# Patient Record
Sex: FEMALE | Race: WHITE | NOT HISPANIC OR LATINO | Employment: FULL TIME
[De-identification: names, ages, dates, MRNs, and addresses within clinical notes are randomized per-mention and may not be internally consistent; named-entity substitution may affect disease eponyms.]

---

## 2017-06-03 ENCOUNTER — HEALTH MAINTENANCE LETTER (OUTPATIENT)
Age: 32
End: 2017-06-03

## 2017-08-01 ENCOUNTER — OFFICE VISIT (OUTPATIENT)
Dept: FAMILY MEDICINE | Facility: CLINIC | Age: 32
End: 2017-08-01

## 2017-08-01 VITALS
WEIGHT: 234.4 LBS | RESPIRATION RATE: 20 BRPM | HEART RATE: 103 BPM | BODY MASS INDEX: 34.72 KG/M2 | OXYGEN SATURATION: 99 % | SYSTOLIC BLOOD PRESSURE: 106 MMHG | DIASTOLIC BLOOD PRESSURE: 82 MMHG | TEMPERATURE: 99.3 F | HEIGHT: 69 IN

## 2017-08-01 DIAGNOSIS — N39.0 URINARY TRACT INFECTION, SITE UNSPECIFIED: ICD-10-CM

## 2017-08-01 DIAGNOSIS — R30.0 DYSURIA: Primary | ICD-10-CM

## 2017-08-01 LAB
BACTERIA URINE: ABNORMAL
BILIRUB UR QL STRIP: 0
BLOOD URINE DIP: 0
CASTS/LPF: 0
COLOR UR: YELLOW
CRYSTAL URINE: 0
EPITHELIAL CELLS - QUEST: ABNORMAL
GLUCOSE UR STRIP-MCNC: 0 MG/DL
KETONES UR QL STRIP: 0
LEUKOCYTE ESTERASE URINE DIP: ABNORMAL
MUCOUS URINE: 0
NITRITE UR QL STRIP: ABNORMAL
PH UR STRIP: 5.5 PH (ref 5–9)
PROT UR QL: 0 MG/DL (ref ?–0.01)
RBC URINE: ABNORMAL (ref 0–3)
SP GR UR STRIP: 1 (ref 1–1.02)
UROBILINOGEN UR QL STRIP: 0.2 EU/DL (ref 0.2–1)
WBC URINE: ABNORMAL (ref 0–3)

## 2017-08-01 PROCEDURE — 99213 OFFICE O/P EST LOW 20 MIN: CPT | Performed by: FAMILY MEDICINE

## 2017-08-01 PROCEDURE — 81003 URINALYSIS AUTO W/O SCOPE: CPT | Performed by: FAMILY MEDICINE

## 2017-08-01 RX ORDER — AMOXICILLIN 500 MG/1
500 CAPSULE ORAL 3 TIMES DAILY
Qty: 21 CAPSULE | Refills: 0 | Status: SHIPPED | OUTPATIENT
Start: 2017-08-01 | End: 2019-07-10

## 2017-08-01 NOTE — MR AVS SNAPSHOT
After Visit Summary   8/1/2017    Tisha Almeida    MRN: [id number]           Patient Information     Date Of Birth          1985        Visit Information        Provider Department      8/1/2017 4:30 PM Garrick Shannon MD [facility]        Today's Diagnoses     Dysuria    -  1    Urinary tract infection, site unspecified          Care Instructions      Urinary Tract Infections in Women    Urinary tract infections (UTIs) are most often caused by bacteria (germs). These bacteria enter the urinary tract. The bacteria may come from outside the body. Or they may travel from the skin outside the rectum or vagina into the urethra. Female anatomy makes it easy for bacteria from the bowel to enter a woman s urinary tract, which is the most common source of UTI. This means women develop UTIs more often than men. Pain in or around the urinary tract is a common UTI symptom. But the only way to know for sure if you have a UTI for the healthcare provider to test your urine. The two tests that may be done are the urinalysis and urine culture.  Types of UTIs    Cystitis: A bladder infection (cystitis) is the most common UTI in women. You may have urgent or frequent urination. You may also have pain, burning when you urinate, and bloody urine.    Urethritis: This is an inflamed urethra, which is the tube that carries urine from the bladder to outside the body. You may have lower stomach or back pain. You may also have urgent or frequent urination.    Pyelonephritis: This is a kidney infection. If not treated, it can be serious and damage your kidneys. In severe cases, you may be hospitalized. You may have a fever and lower back pain.  Medicines to treat a UTI  Most UTIs are treated with antibiotics. These kill the bacteria. The length of time you need to take them depends on the type of infection. It may be as short as 3 days. If you have repeated UTIs, a low-dose antibiotic may be needed for  several months. Take antibiotics exactly as directed. Don t stop taking them until all of the medicine is gone. If you stop taking the antibiotic too soon, the infection may not go away, and you may develop a resistance to the antibiotic. This can make it much harder to treat.  Lifestyle changes to treat and prevent UTIs  The lifestyle changes below will help get rid of your UTI. They may also help prevent future UTIs.    Drink plenty of fluids. This includes water, juice, or other caffeine-free drinks. Fluids help flush bacteria out of your body.    Empty your bladder. Always empty your bladder when you feel the urge to urinate. And always urinate before going to sleep. Urine that stays in your bladder can lead to infection. Try to urinate before and after sex as well.    Practice good personal hygiene. Wipe yourself from front to back after using the toilet. This helps keep bacteria from getting into the urethra.    Use condoms during sex. These help prevent UTIs caused by sexually transmitted bacteria. Also, avoid using spermicides during sex. These can increase the risk of UTIs. Choose other forms of birth control instead. For women who tend to get UTIs after sex, a low-dose of a preventive antibiotic may be used. Be sure to discuss this option with your healthcare provider.    Follow up with your healthcare provider as directed. He or she may test to make sure the infection has cleared. If needed, more treatment may be started.  Date Last Reviewed: 1/1/2017 2000-2017 [employer]. [address] [place] [address], [place] [address]. All rights reserved. This information is not intended as a substitute for professional medical care. Always follow your healthcare professional's instructions.                Follow-ups after your visit        Who to contact     If you have questions or need follow up information about today's clinic visit or your schedule please contact [facility] directly at  "[phone].  Normal or non-critical lab and imaging results will be communicated to you by MyChart, letter or phone within 4 business days after the clinic has received the results. If you do not hear from us within 7 days, please contact the clinic through [employer] or phone. If you have a critical or abnormal lab result, we will notify you by phone as soon as possible.  Submit refill requests through [employer] or call your pharmacy and they will forward the refill request to us. Please allow 3 business days for your refill to be completed.          Additional Information About Your Visit        [email or web address] Information     [employer] lets you send messages to your doctor, view your test results, renew your prescriptions, schedule appointments and more. To sign up, go to [email or web address] . Click on \"Log in\" on the left side of the screen, which will take you to the Welcome page. Then click on \"Sign up Now\" on the right side of the page.     You will be asked to enter the access code listed below, as well as some personal information. Please follow the directions to create your username and password.     Your access code is: [email or web address]  Expires: 10/30/2017  5:33 PM     Your access code will  in 90 days. If you need help or a new code, please call your [place] clinic or [phone].        Care EveryWhere ID     This is your Care EveryWhere ID. This could be used by other organizations to access your [place] medical records  [id number]        Your Vitals Were     Pulse Temperature Respirations Height Last Period Pulse Oximetry    103 99.3  F (37.4  C) (Oral) 20 1.753 m (5' 9\") 2017 99%    BMI (Body Mass Index)                   34.61 kg/m2            Blood Pressure from Last 3 Encounters:   17 106/82   16 128/88   16 138/82    Weight from Last 3 Encounters:   17 106.3 kg (234 lb 6.4 oz)   16 106.1 kg (234 lb)   16 106.6 kg (235 lb)              We Performed the " Following     Urinalysis w/reflex protein, bili ([facility])     Urine Culture  Routine ([facility])          Today's Medication Changes          These changes are accurate as of: 8/1/17  5:33 PM.  If you have any questions, ask your nurse or doctor.               Start taking these medicines.        Dose/Directions    amoxicillin 500 MG capsule   Commonly known as:  AMOXIL   Used for:  Dysuria, Urinary tract infection, site unspecified   Started by:  Garrick Shannon MD        Dose:  500 mg   Take 1 capsule (500 mg) by mouth 3 times daily   Quantity:  21 capsule   Refills:  0            Where to get your medicines      These medications were sent to [facility] - [place], [place] - [address] AT [facility] [address] [facility] [address]  [address] [facility], [facility] [address]     Phone:  [phone]     amoxicillin 500 MG capsule                Primary Care Provider Office Phone # Fax #    Alvin Yoon [phone]       [facility] [address] [place] [address]        Equal Access to Services     [place] [facility]ALVARO : Hadii aad ku hadasho Soomaali, waaxda luqadaha, qaybta kaalmada adeegyada, jorge luis guadarrama . So [facility] [phone].    ATENCIÓN: Si habla español, tiene a brito disposición servicios gratyonnyos de asistencia lingüística. [facility] [phone].    We comply with applicable federal civil rights laws and [place] laws. We do not discriminate on the basis of race, color, national origin, age, disability sex, sexual orientation or gender identity.            Thank you!     Thank you for choosing [facility]  for your care. Our goal is always to provide you with excellent care. Hearing back from our patients is one way we can continue to improve our services. Please take a few minutes to complete the written survey that you may receive in the mail after your visit with us. Thank you!             Your Updated  Medication List - Protect others around you: Learn how to safely use, store and throw away your medicines at www.disposemymeds.org.          This list is accurate as of: 8/1/17  5:33 PM.  Always use your most recent med list.                   Brand Name Dispense Instructions for use Diagnosis    amoxicillin 500 MG capsule    AMOXIL    21 capsule    Take 1 capsule (500 mg) by mouth 3 times daily    Dysuria, Urinary tract infection, site unspecified       MELATONIN PO      Take 5 mg by mouth        MULTIVITAMIN & MINERAL PO      Take 1 tablet by mouth daily        NUVARING VA

## 2017-08-01 NOTE — PATIENT INSTRUCTIONS
Urinary Tract Infections in Women    Urinary tract infections (UTIs) are most often caused by bacteria (germs). These bacteria enter the urinary tract. The bacteria may come from outside the body. Or they may travel from the skin outside the rectum or vagina into the urethra. Female anatomy makes it easy for bacteria from the bowel to enter a woman s urinary tract, which is the most common source of UTI. This means women develop UTIs more often than men. Pain in or around the urinary tract is a common UTI symptom. But the only way to know for sure if you have a UTI for the healthcare provider to test your urine. The two tests that may be done are the urinalysis and urine culture.  Types of UTIs    Cystitis: A bladder infection (cystitis) is the most common UTI in women. You may have urgent or frequent urination. You may also have pain, burning when you urinate, and bloody urine.    Urethritis: This is an inflamed urethra, which is the tube that carries urine from the bladder to outside the body. You may have lower stomach or back pain. You may also have urgent or frequent urination.    Pyelonephritis: This is a kidney infection. If not treated, it can be serious and damage your kidneys. In severe cases, you may be hospitalized. You may have a fever and lower back pain.  Medicines to treat a UTI  Most UTIs are treated with antibiotics. These kill the bacteria. The length of time you need to take them depends on the type of infection. It may be as short as 3 days. If you have repeated UTIs, a low-dose antibiotic may be needed for several months. Take antibiotics exactly as directed. Don t stop taking them until all of the medicine is gone. If you stop taking the antibiotic too soon, the infection may not go away, and you may develop a resistance to the antibiotic. This can make it much harder to treat.  Lifestyle changes to treat and prevent UTIs  The lifestyle changes below will help get rid of your UTI. They may  also help prevent future UTIs.    Drink plenty of fluids. This includes water, juice, or other caffeine-free drinks. Fluids help flush bacteria out of your body.    Empty your bladder. Always empty your bladder when you feel the urge to urinate. And always urinate before going to sleep. Urine that stays in your bladder can lead to infection. Try to urinate before and after sex as well.    Practice good personal hygiene. Wipe yourself from front to back after using the toilet. This helps keep bacteria from getting into the urethra.    Use condoms during sex. These help prevent UTIs caused by sexually transmitted bacteria. Also, avoid using spermicides during sex. These can increase the risk of UTIs. Choose other forms of birth control instead. For women who tend to get UTIs after sex, a low-dose of a preventive antibiotic may be used. Be sure to discuss this option with your healthcare provider.    Follow up with your healthcare provider as directed. He or she may test to make sure the infection has cleared. If needed, more treatment may be started.  Date Last Reviewed: 1/1/2017 2000-2017 [employer]. [address] [place] [address]. All rights reserved. This information is not intended as a substitute for professional medical care. Always follow your healthcare professional's instructions.

## 2017-08-01 NOTE — LETTER
[facility] [address] [place]  [address]  Phone: [phone]    August 7, 2017      Tisha Marion Almeida  [address] [facility] [address]              Dear Tisha,    The Urine test showed a common bacteria - E coli. You should feel better. Repeat the urine test after treating.        Sincerely,     Garrick Seth M.D.    Results for orders placed or performed in visit on 08/01/17   Urinalysis w/reflex protein, bili ([facility])   Result Value Ref Range    Color Urine Yellow     pH Urine 5.5 5 - 9 pH    Specific Gravity Urine 1.005 1.005 - 1.025    Protein Urine 0 0.01 mg/dL    Glucose Urine 0     Ketones Urine 0     Leukocyte Esterase Urine 1+ (A)     Blood Urine 0     Nitrite Urine Neg NEG    Bilirubin Urine Dip 0     Urobilinogen Urine 0.2 0.2 - 1.0 EU/dL    WBC Urine 6-11 0 - 3    RBC Urine 0-3 0 - 3    Epithelial Cells Few     Crystal Urine 0     Bacteria Urine Mod     Mucous Urine 0     Casts/LPF 0    Urine Culture  Routine ([facility])   Result Value Ref Range    Urine Culture Final report (A)     Result 1 Escherichia coli (A)     Antimicrobial Susceptibility Comment     Narrative    Performed at:  01 - [facility] [address], [place], [place]  [id number]  : Pierre Ham MD, Phone:  [phone]

## 2017-08-01 NOTE — PROGRESS NOTES
SUBJECTIVE: Tisha Almeida is a 31 year old female who complains of urinary frequency, urgency and dysuria x 2 days, without flank pain, fever, chills, or abnormal vaginal discharge or bleeding.   No bubble bath   More sexually active recent  Past Medical History:   Diagnosis Date     Single kidney      Wilm's tumor (nephroblastoma) (H)     10 year old     Current Outpatient Prescriptions   Medication         Etonogestrel-Ethinyl Estradiol (NUVARING VA)     MELATONIN PO     Multiple Vitamins-Minerals (MULTIVITAMIN & MINERAL PO)     No current facility-administered medications for this visit.        OBJECTIVE: Appears well, in no apparent distress.  Vital signs are normal. The abdomen is soft without tenderness, guarding, mass, rebound or organomegaly. No CVA tenderness or inguinal adenopathy noted. Urine dipstick shows positive for WBC's.  Micro exam: 5-10 WBC's per HPF.     ASSESSMENT: UTI uncomplicated without evidence of pyelonephritis    PLAN: Treatment per orders - also push fluids, may use Pyridium OTC prn. Call or return to clinic prn if these symptoms worsen or fail to improve as anticipated.  UA AFTER ROSEES    Herb Seth MD

## 2017-08-05 LAB
ANTIMICROBIAL SUSCEPTIBILITY: ABNORMAL
Lab: ABNORMAL
URINE CULTURE: ABNORMAL

## 2019-06-17 PROBLEM — N39.0 URINARY TRACT INFECTION: Status: RESOLVED | Noted: 2017-08-01 | Resolved: 2017-08-01

## 2019-07-10 ENCOUNTER — OFFICE VISIT (OUTPATIENT)
Dept: FAMILY MEDICINE | Facility: CLINIC | Age: 34
End: 2019-07-10

## 2019-07-10 VITALS
HEART RATE: 78 BPM | WEIGHT: 237 LBS | SYSTOLIC BLOOD PRESSURE: 122 MMHG | DIASTOLIC BLOOD PRESSURE: 80 MMHG | RESPIRATION RATE: 16 BRPM | OXYGEN SATURATION: 98 % | BODY MASS INDEX: 35 KG/M2

## 2019-07-10 DIAGNOSIS — Z90.5 SINGLE KIDNEY: Primary | ICD-10-CM

## 2019-07-10 DIAGNOSIS — M79.605 LOW BACK PAIN RADIATING TO LEFT LOWER EXTREMITY: ICD-10-CM

## 2019-07-10 DIAGNOSIS — M54.50 LOW BACK PAIN RADIATING TO LEFT LOWER EXTREMITY: ICD-10-CM

## 2019-07-10 DIAGNOSIS — N39.0 URINARY TRACT INFECTION WITHOUT HEMATURIA, SITE UNSPECIFIED: Primary | ICD-10-CM

## 2019-07-10 PROCEDURE — 99213 OFFICE O/P EST LOW 20 MIN: CPT | Performed by: NURSE PRACTITIONER

## 2019-07-10 PROCEDURE — 81003 URINALYSIS AUTO W/O SCOPE: CPT | Performed by: NURSE PRACTITIONER

## 2019-07-10 RX ORDER — ETONOGESTREL/ETHINYL ESTRADIOL .12-.015MG
RING, VAGINAL VAGINAL
Refills: 3 | COMMUNITY
Start: 2019-05-03

## 2019-07-10 RX ORDER — CYCLOBENZAPRINE HCL 5 MG
5 TABLET ORAL 3 TIMES DAILY PRN
Qty: 30 TABLET | Refills: 0 | Status: SHIPPED | OUTPATIENT
Start: 2019-07-10 | End: 2019-07-20

## 2019-07-10 RX ORDER — NITROFURANTOIN MACROCRYSTAL 100 MG
100 CAPSULE ORAL 2 TIMES DAILY
Qty: 10 CAPSULE | Refills: 0 | Status: SHIPPED | OUTPATIENT
Start: 2019-07-10 | End: 2019-07-15

## 2019-07-10 NOTE — PATIENT INSTRUCTIONS
I have prescribed cyclobenzaprine, this medication is a muscle relaxant. You have been prescribed this because tylenol and Ibuprofen have not been affective in controlling the pain.  I have ordered an MRI to look for a potential herniated disk based on your physical exam. We ordered a test to look at your kidney function. I will call with the results of youPatient Education     Cyclobenzaprine Extended Release Capsule  Brand Name: Amrix  What is this medicine?  CYCLOBENZAPRINE (sye kloe CHERELLE za preen) is a muscle relaxer. It is used to treat muscle pain, spasms, and stiffness.  How should I use this medicine?  Take this medicine by mouth with a glass of water. Follow the directions on the prescription label. Do not cut, crush or chew this medicine. If this medicine upsets your stomach, take it with food or milk. Take your medicine at regular intervals. Do not take it more often than directed.  Talk to your pediatrician regarding the use of this medicine in children. Special care may be needed.  What side effects may I notice from receiving this medicine?  Side effects that you should report to your doctor or health care professional as soon as possible:    allergic reactions like skin rash, itching or hives, swelling of the face, lips, or tongue    breathing problems    chest pain    fast, irregular heartbeat    hallucinations    seizures    unusually weak or tired  Side effects that usually do not require medical attention (report to your doctor or health care professional if they continue or are bothersome):    headache    nausea, vomiting  What may interact with this medicine?  Do not take this medicine with any of the following medications:    certain medicines for fungal infections like fluconazole, itraconazole, ketoconazole, posaconazole, voriconazole    cisapride    dofetilide    dronedarone    halofantrine    levomethadyl    MAOIs like Carbex, Eldepryl, Marplan, Nardil, and Parnate    narcotic medicines for  cough    pimozide    thioridazine    ziprasidone  This medicine may also interact with the following medications:    alcohol    antihistamines for allergy, cough and cold    certain medicines for anxiety or sleep    certain medicines for cancer    certain medicines for depression like amitriptyline, fluoxetine, sertraline    certain medicines for infection like alfuzosin, chloroquine, clarithromycin, levofloxacin, mefloquine, pentamidine, troleandomycin    certain medicines for irregular heart beat    certain medicines for seizures like phenobarbital, primidone    contrast dyes    general anesthetics like halothane, isoflurane, methoxyflurane, propofol    local anesthetics like lidocaine, pramoxine, tetracaine    medicines that relax muscles for surgery    narcotic medicines for pain    other medicines that prolong the QT interval (cause an abnormal heart rhythm)    phenothiazines like chlorpromazine, mesoridazine, prochlorperazine  What if I miss a dose?  If you miss a dose, take it as soon as you can. If it is almost time for your next dose, take only that dose. Do not take double or extra doses.  Where should I keep my medicine?  Keep out of the reach of children.  Store at room temperature between 15 and 30 degrees C (59 and 86 degrees F). Keep container tightly closed. Throw away any unused medicine after the expiration date.  What should I tell my health care provider before I take this medicine?  They need to know if you have any of these conditions:    heart disease    irregular heartbeat    liver disease    past heart attack    thyroid problem    an unusual or allergic reaction to cyclobenzaprine, tricyclic antidepressants, lactose, other medicines, foods, dyes, or preservatives    pregnant or trying to get pregnant    breast-feeding  What should I watch for while using this medicine?  Tell your doctor or healthcare professional if your symptoms do not start to get better or if they get worse.  You may get  drowsy or dizzy. Do not drive, use machinery, or do anything that needs mental alertness until you know how this medicine affects you. Do not stand or sit up quickly, especially if you are an older patient. This reduces the risk of dizzy or fainting spells. Alcohol may interfere with the effect of this medicine. Avoid alcoholic drinks.  If you are taking another medicine that also causes drowsiness, you may have more side effects. Give your health care provider a list of all medicines you use. Your doctor will tell you how much medicine to take. Do not take more medicine than directed. Call emergency for help if you have problems breathing or unusual sleepiness.  Your mouth may get dry. Chewing sugarless gum or sucking hard candy, and drinking plenty of water may help. Contact your doctor if the problem does not go away or is severe.  NOTE:This sheet is a summary. It may not cover all possible information. If you have questions about this medicine, talk to your doctor, pharmacist, or health care provider. Copyright  2018 [employer]         r tests.

## 2019-07-10 NOTE — PROGRESS NOTES
"Specific cause: {.:476759::\"None\"}  Description:   Location of pain: {.:561979}  Character of pain: {.:197845}  Pain radiation:{.:188543::\"none\"}  Intensity: {.:312301}  Accompanying Signs & Symptoms:  Fever: {.:209948::\"no\"}  Numbness or weakness in legs:  { :203681::\"no\"}  Dysuria or Hematuria: { :008297::\"no\"}  Bowel or bladder incontinence: { :751301::\"no\"}  History:   Any injury (lifting, bending, twisting): {.:561034::\"no\"}  Work Injury: {.:020370::\"no\"}  History of back problems: {.:069077::\"no prior back problems\"}  Any previous MRI or X-rays: {.:506757::\"no\"}  Any history of back surgery: { :064015::\"no\"}  Any cancer history: { :559262::\"no\"}  Precipitating factors:   Worsened by: {.:639626::\"Nothing\"}.  Alleviating factors:  Improved by: ***  Therapies Tried and outcome: {.:535457}  "

## 2019-07-10 NOTE — PROGRESS NOTES
UA positive for urinary tract infection. Called Pt. To give results and information about antibiotic therapy.

## 2019-07-10 NOTE — PROGRESS NOTES
Subjective     Tisha Almeida is a 33 year old female who presents to clinic today for the following health issues:    HPI   Back Pain       Duration: More than six months of left lower back pain.        Specific cause: lifting, turning/bending    Description:   Location of pain: low back left and now radiating to left leg  Character of pain: sharp, dull ache, constant and intermittent  Pain radiation:radiates into the left buttocks and radiates into the left leg  New numbness or weakness in legs, not attributed to pain:  no     Intensity: Currently 8/10, At its worst 8/10    History:   Pain interferes with job: YES  History of back problems: no prior back problems  Any previous MRI or X-rays: Yes- MRI: Head (for Hx of HA) at [facility].  Date 2017  Sees a specialist for back pain:  No  Therapies tried without relief: acetaminophen (Tylenol), activity, chiropractor, cold, heat, massage, NSAIDs, rest, sitting, standing and stretch    Alleviating factors:   Improved by: Nothing is helping      Precipitating factors:  Worsened by: Lifting, Bending, Standing, Sitting, Lying Flat and Walking        Accompanying Signs & Symptoms:  Risk of Fracture:  Recent history of trauma or blunt force  Risk of Cauda Equina:  None  Risk of Infection:  None  Risk of Cancer:  History of cancer  Risk of Ankylosing Spondylitis:  Onset at age <35, male, AND morning back stiffness. no       Patient Active Problem List   Diagnosis     Bariatric surgery status     Single kidney     Past Surgical History:   Procedure Laterality Date     CHOLECYSTECTOMY, LAPOROSCOPIC  2005    Cholecystectomy, Laparoscopic     NEPHRECTOMY  1996    Wilms tumor     TONSILLECTOMY & ADENOIDECTOMY  2004       Social History     Tobacco Use     Smoking status: Never Smoker     Smokeless tobacco: Never Used   Substance Use Topics     Alcohol use: Yes     Alcohol/week: 0.0 oz     No family history on file.      Current Outpatient Medications   Medication Sig  Dispense Refill     MELATONIN PO Take 5 mg by mouth       NUVARING 0.12-0.015 MG/24HR vaginal ring   3     No Known Allergies    Reviewed and updated as needed this visit by Provider  Med Hx         Review of Systems   ROS COMP: CONSTITUTIONAL:NEGATIVE for fever, chills, change in weight  RESP:NEGATIVE for significant cough or SOB  : negative for, decreased urinary stream, dysparunia, dysuria, frequency , hematuria, hesitancy, incontinence, kidney stone, retention, sexually transmitted disease, urgency and urinary tract infection  MUSCULOSKELETAL: NEGATIVE for significant arthralgias or myalgia and POSITIVE  for back pain lower left back and radicular pain left buttocks and leg above the knee  NEURO: NEGATIVE for weakness, dizziness or paresthesias      Objective    /80   Pulse 78   Resp 16   Wt 107.5 kg (237 lb)   LMP 07/03/2019   SpO2 98%   BMI 35.00 kg/m    Body mass index is 35 kg/m . LMP: 06/28/2019  Physical Exam   GENERAL: healthy, alert and no distress  NECK: no adenopathy, no asymmetry, masses, or scars and thyroid normal to palpation  ABDOMEN: soft, nontender, no hepatosplenomegaly, no masses and bowel sounds normal  MS: no gross musculoskeletal defects noted, no edema  SKIN: no suspicious lesions or rashes  NEURO: Normal strength and tone, mentation intact and speech normal  PSYCH: mentation appears normal, affect normal/bright    Labs reviewed in Epic        Assessment  1. Low back pain radiating to left lower extremity    2. Single kidney        Plan  Orders Placed This Encounter     MR Lumbar Spine w/o Contrast     Urinalysis w/reflex protein, bili ([facility])     Comp. Metabolic Panel (14) ([facility])     NUVARING 0.12-0.015 MG/24HR vaginal ring     cyclobenzaprine (FLEXERIL) 5 MG tablet       I have prescribed cyclobenzaprine, this medication is a muscle relaxant. You have been prescribed this because tylenol and Ibuprofen have not been affective in controlling the pain.  I have ordered an MRI  to look for a potential herniated disk based on your physical exam. We ordered a test to look at your kidney function. I will call with the results of your tests.

## 2019-07-11 LAB
ALBUMIN SERPL-MCNC: 4.4 G/DL (ref 3.5–5.5)
ALBUMIN/GLOB SERPL: 1.7 {RATIO} (ref 1.2–2.2)
ALP SERPL-CCNC: 71 IU/L (ref 39–117)
ALT SERPL-CCNC: 17 IU/L (ref 0–32)
AST SERPL-CCNC: 15 IU/L (ref 0–40)
BILIRUB SERPL-MCNC: 0.3 MG/DL (ref 0–1.2)
BUN SERPL-MCNC: 8 MG/DL (ref 6–20)
BUN/CREATININE RATIO: 12 (ref 9–23)
CALCIUM SERPL-MCNC: 9.5 MG/DL (ref 8.7–10.2)
CHLORIDE SERPLBLD-SCNC: 105 MMOL/L (ref 96–106)
CREAT SERPL-MCNC: 0.69 MG/DL (ref 0.57–1)
EGFR IF AFRICN AM: 130 ML/MIN/1.73
EGFR IF NONAFRICN AM: 113 ML/MIN/1.73
GLOBULIN, TOTAL: 2.6 G/DL (ref 1.5–4.5)
GLUCOSE SERPL-MCNC: 73 MG/DL (ref 65–99)
POTASSIUM SERPL-SCNC: 4 MMOL/L (ref 3.5–5.2)
PROT SERPL-MCNC: 7 G/DL (ref 6–8.5)
SODIUM SERPL-SCNC: 139 MMOL/L (ref 134–144)
TOTAL CO2: 24 MMOL/L (ref 20–29)

## 2019-07-12 ENCOUNTER — TRANSFERRED RECORDS (OUTPATIENT)
Dept: FAMILY MEDICINE | Facility: CLINIC | Age: 34
End: 2019-07-12

## 2019-07-12 ENCOUNTER — TRANSFERRED RECORDS (OUTPATIENT)
Dept: HEALTH INFORMATION MANAGEMENT | Facility: CLINIC | Age: 34
End: 2019-07-12

## 2019-07-12 LAB
Lab: NO GROWTH
URINE CULTURE: NORMAL

## 2019-08-01 ENCOUNTER — TRANSFERRED RECORDS (OUTPATIENT)
Dept: FAMILY MEDICINE | Facility: CLINIC | Age: 34
End: 2019-08-01

## 2019-12-09 ENCOUNTER — HEALTH MAINTENANCE LETTER (OUTPATIENT)
Age: 34
End: 2019-12-09

## 2020-01-14 ENCOUNTER — TELEPHONE (OUTPATIENT)
Dept: FAMILY MEDICINE | Facility: CLINIC | Age: 35
End: 2020-01-14

## 2020-01-14 DIAGNOSIS — J11.1 INFLUENZA-LIKE ILLNESS: Primary | ICD-10-CM

## 2020-01-14 RX ORDER — OSELTAMIVIR PHOSPHATE 75 MG/1
75 CAPSULE ORAL 2 TIMES DAILY
Qty: 10 CAPSULE | Refills: 0 | Status: SHIPPED | OUTPATIENT
Start: 2020-01-14 | End: 2020-01-19

## 2020-01-14 NOTE — TELEPHONE ENCOUNTER
Patient calls reporting onset early this AM of fever of 101, body aches, headache, cough and sore throat.  Denies wheeze, dyspnea, chest congestion.   No known influenza exposure.   Did not get flu shot this season.   No chronic diseases.   Lives alone.   Plan: per Dr. Car beasley for Tamiflu 75mg BID x 5 days. Discussed home treatments for viral syndrome. Discussed signs and symptoms of concern and call/get eval if these present. Patient verbalized understanding. Rx sent to pharmacy.  Polly Ott RN

## 2020-03-15 ENCOUNTER — HEALTH MAINTENANCE LETTER (OUTPATIENT)
Age: 35
End: 2020-03-15

## 2020-04-25 ENCOUNTER — VIRTUAL VISIT (OUTPATIENT)
Dept: FAMILY MEDICINE | Facility: OTHER | Age: 35
End: 2020-04-25

## 2020-04-25 ENCOUNTER — NURSE TRIAGE (OUTPATIENT)
Dept: NURSING | Facility: CLINIC | Age: 35
End: 2020-04-25

## 2020-04-25 NOTE — PROGRESS NOTES
"Date: 2020 14:31:25  Clinician: Rebecca Kelley  Clinician NPI: [id number]  Patient: Tisha Almeida  Patient : 1985  Patient Address: [address] [place] [address]  Patient Phone: [phone]  Visit Protocol: URI  Patient Summary:  Tisha is a 34 year old ( : 1985 ) female who initiated a Visit for COVID-19 (Coronavirus) evaluation and screening. When asked the question \"Please sign me up to receive news, health information and promotions from [employer].\", Tisha responded \"No\".    Her symptoms consist of facial pain or pressure, rhinitis, a headache, and nausea.   Symptom details     Nasal secretions: The color of her mucus is clear.    Facial pain or pressure: The facial pain or pressure does not feel worse when bending or leaning forward.     Headache: She states the headache is mild (1-3 on a 10 point pain scale).      Tisha denies having sore throat, cough, nasal congestion, malaise, ear pain, fever, myalgias, wheezing, chills, vomiting, teeth pain, ageusia, anosmia, and diarrhea. She also denies taking antibiotic medication for the symptoms and having recent facial or sinus surgery in the past 60 days. She is not experiencing dyspnea.    Pertinent COVID-19 (Coronavirus) information  Tisha has not traveled internationally or to the areas where COVID-19 (Coronavirus) is widespread, including cruise ship travel in the last 14 days before the start of her symptoms.   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   Tisha has had a close contact with a laboratory-confirmed COVID-19 patient within 14 days of symptom onset. She also has had a close contact with a suspected COVID-19 patient within 14 days of symptom onset. She " was exposed at her work. Additional information about contact with COVID-19 (Coronavirus) patient as reported by the patient (free text): 4/20/20, worked within close proximity of a co-worker with a now confirmed covid case. Work is requiring I get tested.   Pertinent medical history  Tisha does not get yeast infections when she takes antibiotics.   Tisha needs a return to work/school note.   Weight: 235 lbs   Tisha does not smoke or use smokeless tobacco.   She denies pregnancy and denies breastfeeding. She has menstruated in the past month.   Weight: 235 lbs    MEDICATIONS: NuvaRing vaginal, ALLERGIES: NKDA  Clinician Response:  Dear Tisha,   Dear Tisha  Your symptoms show that you may have coronavirus (COVID-19). This illness can cause fever, cough and trouble breathing. Many people get a mild case and get better on their own. Some people can get very sick.  Will I be tested for COVID-19?   Currently we do not have expanded testing available through [facility]. However things may change in the next week. I would encourage you to sign up for the get well loop (see below) so we could keep communicating and you can keep checking in to when our testing is able to expand.&nbsp;  At this time you may request for testing if:&nbsp;   You are very ill. For example, you're on chemotherapy, dialysis or home hospice care. (Contact your specialty clinic or program.)   You live in a nursing home or other long-term care facility. (Talk to your nurse manager or medical director.)   You're a health care worker. (Contact your employee health office.)   How can I protect others?  Without a test, we can't know for sure that you have COVID-19. For safety, it's very important to follow these rules.  First, stay home and away from others (self-isolate) until:   You've had no fever---and no medicine that reduces fever---for 3 full days (72 hours). And...    Your other symptoms have gotten better. For example, your cough or  breathing has improved. And...   At least 7 days have passed since your symptoms started.   During this time:   Don't go to work, school or anywhere else.    Stay away from others in your home. No hugging, kissing or shaking hands.   Don't let anyone visit.   Cover your mouth and nose with a mask, tissue or wash cloth to avoid spreading germs.   Wash your hands and face often. Use soap and water.   How can I take care of myself?   1.Take Tylenol (acetaminophen) for fever or pain. If you have liver or kidney problems, ask your family doctor if it's okay to take Tylenol.        Adults can take either:    650 mg (two 325 mg pills) every 4 to 6 hours, or...   1,000 mg (two 500 mg pills) every 8 hours as needed.    Note: Don't take more than 3,000 mg in one day.   For children, check the Tylenol bottle for the right dose. The dose is based on the child's age or weight.   2.If you have other health problems (like cancer, heart failure, an organ transplant or severe kidney disease): Call your specialty clinic if you don't feel better in the next 2 days.       3.Know when to call 911: If your breathing is so bad that it keeps you from doing normal activities, call 911 or go to the emergency room. Tell them that you've been staying home and may have COVID-19.       4.Sign up for [facility]. We know it's scary to hear that you might have COVID-19. We want to track your symptoms to make sure you're okay over the next 2 weeks. Please look for an email from [facility]---this is a free, online program that we'll use to keep in touch. To sign up, follow the link in the email. Learn more at [email or web address].   Where can I get more information?  To learn more about COVID-19 and how to care for yourself at home, please visit the CDC website at https://www.cdc.gov/coronavirus/2019-ncov/about/steps-when-sick.html.  For more options for care at [facility], please visit our website at  [email or web address]     Diagnosis: Other malaise  Diagnosis ICD: R53.81

## 2020-04-25 NOTE — TELEPHONE ENCOUNTER
Patient called because she was exposed to someone that was Covid-19 positive. She completed [facility] and is aware that she needs to self quarantine. She is not experiencing any symptoms. She wanted to know where to go to get testing for Covid-19. She was informed that since she didn't have any symptoms, testing was not currently done on patients without symptoms. She stated her employer told her to get tested. I referred her to her employee health department. She said they referred her to this phone number. I explained again that people without symptoms are currently not being tested. She said she will follow-up on it herself.    Courtney Kapoor RN on 4/25/2020 at 3:49 PM      COVID 19 Nurse Triage Plan/Patient Instructions    Please be aware that novel coronavirus (COVID-19) may be circulating in the community. If you develop symptoms such as fever, cough, or SOB or if you have concerns about the presence of another infection including coronavirus (COVID-19), please contact your health care provider or visit [email or web address].     Disposition/Instructions    Patient to stay at home and follow home care protocol based instructions.     Thank you for limiting contact with others, wearing a simple mask to cover your cough, practice good hand hygiene habits and accessing our virtual services where possible to limit the spread of this virus.    For more information about COVID19 and options for caring for yourself at home, please visit the CDC website at https://www.cdc.gov/coronavirus/2019-ncov/about/steps-when-sick.html  For more options for care at [facility], please visit our website at [email or web address]    For more information, please use the [place] Department of Health COVID-19 Website: https://www.health.state.[place]/diseases/coronavirus/index.html  [facility] ([facility]) COVID-19 Hotlines (Interpreters available):      Health questions: Phone Number:  "[phone] or 1-495.745.1602 and Hours: 7 a.m. to 7 p.m.    Schools and  questions: Phone Number: [phone] or [phone] and Hours 7 a.m. to 7 p.m.    Reason for Disposition    [1] COVID-19 EXPOSURE (Close Contact) within last 14 days AND [2] NO cough, fever, or breathing difficulty    Additional Information    Negative: SEVERE difficulty breathing (e.g., struggling for each breath, speak in single words, bluish lips)    Negative: Sounds like a life-threatening emergency to the triager    Negative: [1] Difficulty breathing occurs AND [2] within 14 days of COVID-19 EXPOSURE (Close Contact)    Negative: Patient sounds very sick or weak to the triager    Negative: [1] Fever (or feeling feverish) OR cough AND [2] within 14 Days of COVID-19 EXPOSURE (Close Contact)    Negative: [1] Fever (or feeling feverish) OR cough occurs AND [2] within 14 days of travel from another country (international travel)    Negative: [1] Fever (or feeling feverish) OR cough occurs AND [2] within 14 days of travel from a city or area with major community spread    Negative: [1] Fever (or feeling feverish) OR cough occurs AND [2] living in area with major community spread AND [3] testing being done for symptoms    Negative: [1] Mild body aches, chills, diarrhea, headache, runny nose, or sore throat AND [2] within 14 days of COVID-Exposure    Negative: [1] COVID-19 EXPOSURE within last 14 days AND [2] NO cough, fever, or breathing difficulty AND [3] exposed 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 CLOSE CONTACT: \"Who is the person with the confirmed or suspected COVID-19 infection that you were exposed to?\"      A coworker  2. PLACE of CONTACT: \"Where were you when you were exposed to COVID-19?\" (e.g., home, school, medical waiting room; which city?)      " "While at work  3. TYPE of CONTACT: \"How much contact was there?\" (e.g., sitting next to, live in same house, work in same office, same building)     Unknown  4. DURATION of CONTACT: \"How long were you in contact with the COVID-19 patient?\" (e.g., a few seconds, passed by person, a few minutes, live with the patient)      Unknown  5. DATE of CONTACT: \"When did you have contact with a COVID-19 patient?\" (e.g., how many days ago)      It was not a patient, it was a coworker  6. TRAVEL: \"Have you traveled out of the country recently?\" If so, \"When and where?\"      * Also ask about out-of-state travel, since the [facility] has identified some high risk cities for community spread in the .      * Note: Travel becomes less relevant if there is widespread community transmission where the patient lives.      No  7. COMMUNITY SPREAD: \"Are there lots of cases or COVID-19 (community spread) where you live?\" (See public health department website, if unsure)    * MAJOR community spread: high number of cases; numbers of cases are increasing; many people hospitalized.    * MINOR community spread: low number of cases; not increasing; few or no people hospitalized      Group home  8. SYMPTOMS: \"Do you have any symptoms?\" (e.g., fever, cough, breathing difficulty)      None  9. PREGNANCY OR POSTPARTUM: \"Is there any chance you are pregnant?\" \"When was your last menstrual period?\" \"Did you deliver in the last 2 weeks?\"      No  10. HIGH RISK: \"Do you have any heart or lung problems? Do you have a weak immune system?\" (e.g., CHF, COPD, asthma, HIV positive, chemotherapy, renal failure, diabetes mellitus, sickle cell anemia)        No    Protocols used: CORONAVIRUS (COVID-19) EXPOSURE-A- 3.30.20      "

## 2020-04-30 ENCOUNTER — VIRTUAL VISIT (OUTPATIENT)
Dept: FAMILY MEDICINE | Facility: CLINIC | Age: 35
End: 2020-04-30

## 2020-04-30 DIAGNOSIS — Z20.822 EXPOSURE TO COVID-19 VIRUS: Primary | ICD-10-CM

## 2020-04-30 PROCEDURE — 99213 OFFICE O/P EST LOW 20 MIN: CPT | Mod: GT | Performed by: FAMILY MEDICINE

## 2020-04-30 NOTE — PROGRESS NOTES
"  Problem(s) Oriented visit      Tisha Almeida is being evaluated via a billable video visit.      The patient has been notified of following:     \"This video visit will be conducted via a call between you and your physician/provider. We have found that certain health care needs can be provided without the need for an in-person physical exam.  This service lets us provide the care you need with a video conversation.  If a prescription is necessary we can send it directly to your pharmacy.  If lab work is needed we can place an order for that and you can then stop by our lab to have the test done at a later time.    If during the course of the call the physician/provider feels a video visit is not appropriate, you will not be charged for this service.\"     Physician has received verbal consent for a Video Visit from the patient? Yes  4:34 PM  SUBJECTIVE:                                                    Subjective     Tisha Almeida is a 34 year old female who presents to clinic today for the following health issues:    HPI   Lots of exposure at work  St.  Random cough  Lots of symptom has zero symtpoms.  She works in a [employer] setting.        Histories:   Patient Active Problem List   Diagnosis     Bariatric surgery status     Single kidney     Past Surgical History:   Procedure Laterality Date     CHOLECYSTECTOMY, LAPOROSCOPIC  2005    Cholecystectomy, Laparoscopic     NEPHRECTOMY  1996    Wilms tumor     TONSILLECTOMY & ADENOIDECTOMY  2004       Social History     Tobacco Use     Smoking status: Never Smoker     Smokeless tobacco: Never Used   Substance Use Topics     Alcohol use: Yes     Alcohol/week: 0.0 standard drinks     No family history on file.        Reviewed and updated as needed this visit by Provider         ROS:  General and Resp. completed and negative except as noted above          OBJECTIVE:                                                      Objective    There were no vitals " "taken for this visit.  Estimated body mass index is 35 kg/m  as calculated from the following:    Height as of 8/1/17: 1.753 m (5' 9\").    Weight as of 7/10/19: 107.5 kg (237 lb).    Physical Exam   APPEARANCE: = Relaxed and in no distress  Recent/Remote Memory = Alert and Oriented x 3  Mood/Affect = Cooperative and interested            ASSESSMENT/PLAN:                                                        There are no Patient Instructions on file for this visit.  There are no diagnoses linked to this encounter.    See Patient Instructions    Video-Visit Details    Type of service:  Video Visit  Originating Location (pt. Location): Home  Distant Location (provider location):  [facility]   Mode of Communication:  Video Conference via Apple Facetime      The following health maintenance items are reviewed in Epic and correct as of today:  Health Maintenance   Topic Date Due     HIV SCREENING  11/04/1998     PAP  11/04/2004     PREVENTIVE CARE VISIT  08/01/2017     INFLUENZA VACCINE (1) 09/01/2019     PHQ-2  01/01/2020     DTAP/TDAP/TD IMMUNIZATION (8 - Td) 08/01/2026     IPV IMMUNIZATION  Completed     MENINGITIS IMMUNIZATION  Aged Out       Alvin Yoon MD  [facility]  Family Practice  [facility]  [phone]    For any issues my office # is [phone]          "

## 2020-05-01 ENCOUNTER — VIRTUAL VISIT (OUTPATIENT)
Dept: FAMILY MEDICINE | Facility: OTHER | Age: 35
End: 2020-05-01

## 2020-05-01 ENCOUNTER — OFFICE VISIT (OUTPATIENT)
Dept: URGENT CARE | Facility: URGENT CARE | Age: 35
End: 2020-05-01
Payer: COMMERCIAL

## 2020-05-01 DIAGNOSIS — Z20.822 SUSPECTED 2019 NOVEL CORONAVIRUS INFECTION: Primary | ICD-10-CM

## 2020-05-01 PROCEDURE — 99000 SPECIMEN HANDLING OFFICE-LAB: CPT | Performed by: FAMILY MEDICINE

## 2020-05-01 PROCEDURE — 99207 ZZC NO BILLABLE SERVICE THIS VISIT: CPT

## 2020-05-01 PROCEDURE — 87635 SARS-COV-2 COVID-19 AMP PRB: CPT | Mod: 90 | Performed by: FAMILY MEDICINE

## 2020-05-01 NOTE — PROGRESS NOTES
"Date: 2020 07:34:08  Clinician: José Luis Staples  Clinician NPI: [id number]  Patient: Tisha Almeida  Patient : 1985  Patient Address: [address] [place] [address]  Patient Phone: [phone]  Visit Protocol: URI  Patient Summary:  Tisha is a 34 year old ( : 1985 ) female who initiated a Visit for COVID-19 (Coronavirus) evaluation and screening. When asked the question \"Please sign me up to receive news, health information and promotions from [employer].\", Tisha responded \"No\".    Tisha states her symptoms started suddenly 3-4 days ago.   Her symptoms consist of malaise, rhinitis, a sore throat, a cough, diarrhea, and a headache.   Symptom details     Nasal secretions: The color of her mucus is clear.    Cough: Tisha coughs a few times an hour and her cough is not more bothersome at night. Phlegm does not come into her throat when she coughs. She does not believe her cough is caused by post-nasal drip.     Sore throat: Tisha reports having moderate throat pain (4-6 on a 10 point pain scale), does not have exudate on her tonsils, and can swallow liquids. She is not sure if the lymph nodes in her neck are enlarged. A rash has not appeared on the skin since the sore throat started.     Headache: She states the headache is mild (1-3 on a 10 point pain scale).      Tisha denies having fever, myalgias, facial pain or pressure, nasal congestion, vomiting, nausea, teeth pain, ageusia, anosmia, ear pain, wheezing, and chills. She also denies taking antibiotic medication for the symptoms, double sickening (worsening symptoms after initial improvement), and having recent facial or sinus surgery in the past 60 days. She is not experiencing dyspnea.   Precipitating events  Within the past week, Tisha has not been exposed to someone with strep throat. She has not recently been exposed to someone with influenza. Tisha has been in close contact with the following high risk individuals: " adults 65 or older, people with asthma, heart disease or diabetes, and immunocompromised people.   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 She provides direct patient care. 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   She lives with a healthcare worker.   Tisha has had a close contact with a laboratory-confirmed COVID-19 patient within 14 days of symptom onset. She also has had a close contact with a suspected COVID-19 patient within 14 days of symptom onset. She was exposed at her work. Additional information about contact with COVID-19 (Coronavirus) patient as reported by the patient (free text): Been work with clients during the entire pandemic, I provided direct care with clients who have been tested positive. They were confirmed positive this week.     I complete toileting, bathing, eating, transfer assistance and more with clients.   Pertinent medical history  Tisha does not get yeast infections when she takes antibiotics.   Tisha does not need a return to work/school note.   Weight: 235 lbs   Tisha does not smoke or use smokeless tobacco.   She denies pregnancy and denies breastfeeding. She has menstruated in the past month.   Weight: 235 lbs    MEDICATIONS: NuvaRing vaginal, ALLERGIES: NKDA  Clinician Response:  Dear Tisha,   Your symptoms show that you may have coronavirus (COVID-19). This illness can cause fever, cough and trouble breathing. Many people get a mild case and get better on their own. Some people can get very sick.   Will I be tested for COVID-19?  We would recommend you be tested for coronavirus. Here is how to get that scheduled:   Call [phone]. Tell them you were referred by [facility] to have a COVID-19 test. You will be scheduled at one of our [facility] testing  locations (drive-up). Please have your [facility] visit information ready when you call including your visit ID number to verify you were referred.    How can I protect others in the meantime?  First, stay home and away from others (self-isolate) until:   You've had no fever---and no medicine that reduces fever---for 3 full days (72 hours). And...    Your other symptoms have gotten better. For example, your cough or breathing has improved. And...    At least 7 days have passed since your symptoms started.   During this time:   Don't go to work, school or anywhere else.     Stay away from others in your home. No hugging, kissing or shaking hands.    Don't let anyone visit.    Cover your mouth and nose with a mask, tissue or wash cloth to avoid spreading germs.    Wash your hands and face often. Use soap and water.   How can I take care of myself?  1.Take Tylenol (acetaminophen) for fever or pain. If you have liver or kidney problems, ask your family doctor if it's okay to take Tylenol.   Adults can take either:    650 mg (two 325 mg pills) every 4 to 6 hours, or...    1,000 mg (two 500 mg pills) every 8 hours as needed.     Note: Don't take more than 3,000 mg in one day.   For children, check the Tylenol bottle for the right dose. The dose is based on the child's age or weight.  2.If you have other health problems (like cancer, heart failure, an organ transplant or severe kidney disease): Call your specialty clinic if you don't feel better in the next 2 days.  3.Know when to call 911: If your breathing is so bad that it keeps you from doing normal activities, call 911 or go to the emergency room. Tell them that you've been staying home and may have COVID-19.  4.Sign up for [employer]. We know it's scary to hear that you might have COVID-19. We want to track your symptoms to make sure you're okay over the next 2 weeks. Please look for an email from [employer]---this is a free, online program that we'll use to keep in  touch. To sign up, follow the link in the email. Learn more at [email or web address].  Where can I get more information?  To learn more about COVID-19 and how to care for yourself at home, please visit the CDC website at https://www.cdc.gov/coronavirus/2019-ncov/about/steps-when-sick.html.  For more about your care at [facility], please visit [email or web address]     Diagnosis: Cough  Diagnosis ICD: R05

## 2020-05-02 LAB
SARS-COV-2 RNA SPEC QL NAA+PROBE: NOT DETECTED
SPECIMEN SOURCE: NORMAL

## 2020-06-16 PROCEDURE — 36415 COLL VENOUS BLD VENIPUNCTURE: CPT | Performed by: FAMILY MEDICINE

## 2020-06-16 PROCEDURE — 86769 SARS-COV-2 COVID-19 ANTIBODY: CPT | Performed by: FAMILY MEDICINE

## 2020-06-16 NOTE — LETTER
June 22, 2020        Tisha Almeida  [address] [address]      COVID-19 Antibody, IgG   Date Value Ref Range Status   06/16/2020 Negative NEG^Negative Final     Comment:     Negative results do not rule out SARS-CoV-2 infection, particularly in those   who have been in contact with the virus.  Follow-up testing with a molecular   diagnostic should be considered to rule out infection in these individuals.  Results from antibody testing should not be used as the sole basis to diagnose   or exclude SARS-CoV-2 infection or to inform infection status.           You have tested NEGATIVE for COVID-19 antibodies. This suggests you have not had or been exposed to COVID-19. But it does not mean that for sure.     The test finds antibodies in most people 10 days after they get sick. For some people, it takes longer than 10 days for antibodies to show up. Others may never show antibodies against COVID-19, especially if they have weak immune systems.    If you have COVID-19 symptoms now, please stay home and away from others.     What is antibody testing?    This is a kind of blood test. We take a small sample of your blood, and then test it for something called  antibodies.      Your body makes antibodies to fight infection. If your blood has antibodies for a certain germ, it means you ve been infected with that germ in the past.     Sometimes, antibodies stay in your body for years after you ve had the infection. They can be there even if the germ didn t make you sick. They are a sign that your body fought off the infection.    Will this test find antibodies in everyone who s had COVID-19?    No. The test finds antibodies in most people 10 days after they get sick. For some people, it takes longer than 10 days for antibodies to show up. Others may never show antibodies against COVID-19, especially if they have weak immune systems.    What does it mean if the test finds COVID-19 antibodies?    If we find  these antibodies, it suggests:     This person has had the virus.     Their body s immune system fought the virus.     We don t know if this will help protect someone from getting COVID-19 again. Scientists are still learning about this.    What are the signs of COVID-19?    Signs of COVID-19 can appear from 2 to 14 days (up to 2 weeks) after you re infected. Some people have no symptoms or only mild symptoms. Others get very sick. The most common symptoms are:          Cough    Shortness of breath or trouble breathing  Or at least 2 of these symptoms:    Fever    Chills    Repeated shaking with chills    Muscle pain    Headache    Sore throat    Losing your sense of taste or smell    You may have other symptoms. Please contact your doctor or clinic for any symptoms that worry you.    Where can I get more information?     To learn the [facility] guidelines for staying home, please visit the [place] Department of Health website at https://www.health.[facility].[place]/diseases/coronavirus/basics.html    To learn more about COVID-19 and how to care for yourself at home, please visit the CDC website at https://www.cdc.gov/coronavirus/2019-ncov/about/steps-when-sick.html    For more options for care at [facility], please visit our website at [email or web address]    [facility] ([facility]) COVID-19 Hotline:  [phone]

## 2020-06-21 LAB
COVID-19 ANTIBODY IGG: NEGATIVE
LAB TEST METHOD: NORMAL

## 2020-09-21 ENCOUNTER — VIRTUAL VISIT (OUTPATIENT)
Dept: FAMILY MEDICINE | Facility: CLINIC | Age: 35
End: 2020-09-21

## 2020-09-21 DIAGNOSIS — Z20.822 SUSPECTED COVID-19 VIRUS INFECTION: ICD-10-CM

## 2020-09-21 DIAGNOSIS — Z20.822 SUSPECTED COVID-19 VIRUS INFECTION: Primary | ICD-10-CM

## 2020-09-21 LAB
SARS-COV-2 RNA SPEC QL NAA+PROBE: NOT DETECTED
SPECIMEN SOURCE: NORMAL

## 2020-09-21 PROCEDURE — 99213 OFFICE O/P EST LOW 20 MIN: CPT | Mod: 95 | Performed by: NURSE PRACTITIONER

## 2020-09-21 PROCEDURE — U0003 INFECTIOUS AGENT DETECTION BY NUCLEIC ACID (DNA OR RNA); SEVERE ACUTE RESPIRATORY SYNDROME CORONAVIRUS 2 (SARS-COV-2) (CORONAVIRUS DISEASE [COVID-19]), AMPLIFIED PROBE TECHNIQUE, MAKING USE OF HIGH THROUGHPUT TECHNOLOGIES AS DESCRIBED BY CMS-2020-01-R: HCPCS | Performed by: NURSE PRACTITIONER

## 2020-09-21 NOTE — PROGRESS NOTES
Problem(s) Oriented visit    Video-Visit Details    Type of service:  Video Visit    Video Start Time (time video started): 1002    Video End Time (time video stopped): 1007    Originating Location (pt. Location): Home    Distant Location (provider location):  [facility]     Mode of Communication:  Video Conference via Facetime    Physician has received verbal consent for a Video Visit from the patient? Yes    This was a virtual video-visit conducted during COVID-19 outbreak in regulation with social distancing and quarantine recommendations of the CDC and [place] department of health and human services. A two way audio/video connection was used in real time with patient's consent.    ALYSE Carrasquillo CNP    SUBJECTIVE:                                                    Tisha Almeida is a 34 year old female who presents to clinic today for the following health issues :    Symptoms started 2 days ago and include sore throat, runny nose, headache, feels tired & run down, cough. Denies fever, chills, loss of taste/smell, shortness of breath, diarrhea.  Has taken a cough drop with some relief.  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 Tested back in June and was negative.  Does not need note for work at this time.    Problem list, Medication list, Allergies, and Medical/Social/Surgical histories reviewed in [facility] and updated as appropriate.   Additional history: as documented    ROS:  7 point ROS completed and negative except noted above, including Gen, HEENT, CV, Resp, GI, Neuro    Histories:   Patient Active Problem List   Diagnosis     Bariatric surgery status     Single kidney     Past Surgical History:   Procedure Laterality Date     CHOLECYSTECTOMY, LAPOROSCOPIC  2005    Cholecystectomy, Laparoscopic     NEPHRECTOMY  1996    Wilms tumor     TONSILLECTOMY & ADENOIDECTOMY  2004       Social History     Tobacco Use     Smoking status: Never Smoker     Smokeless tobacco: Never Used    Substance Use Topics     Alcohol use: Yes     Alcohol/week: 0.0 standard drinks     History reviewed. No pertinent family history.        OBJECTIVE:                                                    No vitals taken due to telehealth visit    APPEARANCE: = Relaxed and in no distress  Conj/Eyelids = noninjected and lids and lashes are without inflammation  Ears/Nose = External structures and Nares have usual shape and form  Resp effort = Calm regular breathing, dry cough x 1  Recent/Remote Memory = Alert and Oriented x 3  Mood/Affect = Cooperative and interested     ASSESSMENT/PLAN:                                                        Tisha was seen today for pharyngitis.    Diagnoses and all orders for this visit:    Suspected COVID-19 virus infection  -     Symptomatic COVID-19 Virus (Coronavirus) by PCR; Future        See Patient Instructions  Patient Instructions   Call [phone] to schedule Covid testing    Discharge Instructions for COVID-19 Patients  You have--or may have--COVID-19. Please follow the instructions listed below.   If you have a weakened immune system, discuss with your doctor any other actions you need to take.  How can I protect others?  If you have symptoms (fever, cough, body aches or trouble breathing):    Stay home and away from others (self-isolate) until:  ? At least 10 days have passed since your symptoms started. And   ? You've had no fever--and no medicine that reduces fever--for 1 full day (24 hours). And   ? Your other symptoms have resolved (gotten better).  If you don't show symptoms, but testing showed that you have COVID-19:    Stay home and away from others (self-isolate) until at least 10 days have passed since the date of your first positive COVID-19 test.  During this time    Stay in your own room, even for meals. Use your own bathroom if you can.    Stay away from others in your home. No hugging, kissing or shaking hands. No visitors.    Don't go to work, school or  "anywhere else.    Clean \"high touch\" surfaces often (doorknobs, counters, handles). Use household cleaning spray or wipes. You'll find a full list of  on the EPA website: www.epa.gov/pesticide-registration/list-n-disinfectants-use-against-sars-cov-2.    Cover your mouth and nose with a mask or other face covering to avoid spreading germs.    Wash your hands and face often. Use soap and water.    Caregivers in these groups are at risk for severe illness due to COVID-19:  ? People 65 years and older  ? People who live in a nursing home or long-term care facility  ? People with chronic disease (lung, heart, cancer, diabetes, kidney, liver, immunologic)  ? People who have a weakened immune system, including those who:    Are in cancer treatment    Take medicine that weakens the immune system, such as corticosteroids    Had a bone marrow or organ transplant    Have an immune deficiency    Have poorly controlled HIV or AIDS    Are obese (body mass index of 40 or higher)    Smoke regularly    Caregivers should wear gloves while washing dishes, handling laundry and cleaning bedrooms and bathrooms.    Use caution when washing and drying laundry: Don't shake dirty laundry and use the warmest water setting that you can.    For more tips on managing your health at home, go to www.cdc.gov/coronavirus/2019-ncov/downloads/10Things.pdf.  How can I take care of myself at home?  1. Get lots of rest. Drink extra fluids (unless a doctor has told you not to).  2. Take Tylenol (acetaminophen) for fever or pain. If you have liver or kidney problems, ask your family doctor if it's okay to take Tylenol.     Adults can take either:  ? 650 mg (two 325 mg pills) every 4 to 6 hours, or   ? 1,000 mg (two 500 mg pills) every 8 hours as needed.  ? Note: Don't take more than 3,000 mg in one day. Acetaminophen is found in many medicines (both prescribed and over-the-counter medicines). Read all labels to be sure you don't take too much.   " For children, check the Tylenol bottle for the right dose. The dose is based on the child's age or weight.  3. If you have other health problems (like cancer, heart failure, an organ transplant or severe kidney disease): Call your specialty clinic if you don't feel better in the next 2 days.  4. Know when to call 911. Emergency warning signs include:  ? Trouble breathing or shortness of breath  ? Pain or pressure in the chest that doesn't go away  ? Feeling confused like you haven't felt before, or not being able to wake up  ? Bluish-colored lips or face  5. Your doctor may have prescribed a blood thinner medicine. Follow their instructions.  Where can I get more information?     [employer] [place] - About COVID-19: [email or web address]    CDC - What to Do If You're Sick: www.cdc.gov/coronavirus/2019-ncov/about/steps-when-sick.html    CDC - Ending Home Isolation: www.cdc.gov/coronavirus/2019-ncov/hcp/disposition-in-home-patients.html    CDC - Caring for Someone: www.cdc.gov/coronavirus/2019-ncov/if-you-are-sick/care-for-someone.html    [facility] - Interim Guidance for Hospital Discharge to Home: www.[facility].[place]/diseases/coronavirus/hcp/hospdischarge.pdf    [facility] clinical trials (COVID-19 research studies): clinicalaffairs.[facility]-clinical-trials    Below are the COVID-19 hotlines at the [facility] of Health ([facility]). Interpreters are available.  ? For health questions: Call 206-183-0773 or 1-534.652.2799 (7 a.m. to 7 p.m.)  ? For questions about schools and childcare: Call 484-769-6811 or 1-193.655.8821 (7 a.m. to 7 p.m.)    For informational purposes only. Not to replace the advice of your health care provider. Clinically reviewed by the Infection Prevention Team. Copyright   2020 [employer]. All rights reserved. [employer] 998733 - REV 08/04/20.          The following health maintenance items are reviewed in Epic and correct as of today:  Health Maintenance   Topic  Date Due     HIV SCREENING  11/04/1998     PAP  11/04/2004     PREVENTIVE CARE VISIT  08/01/2017     PHQ-2  01/01/2020     INFLUENZA VACCINE (1) 09/01/2020     DTAP/TDAP/TD IMMUNIZATION (8 - Td) 08/01/2026     IPV IMMUNIZATION  Completed     MENINGITIS IMMUNIZATION  Aged Out     HEPATITIS B IMMUNIZATION  Aged Out       ALYSE Carrasquillo CNP  [facility]  Family Practice  [facility]  [phone]    For any issues my office # is [phone]

## 2020-09-21 NOTE — PATIENT INSTRUCTIONS
"Call [phone] to schedule Covid testing    Discharge Instructions for COVID-19 Patients  You have--or may have--COVID-19. Please follow the instructions listed below.   If you have a weakened immune system, discuss with your doctor any other actions you need to take.  How can I protect others?  If you have symptoms (fever, cough, body aches or trouble breathing):    Stay home and away from others (self-isolate) until:  ? At least 10 days have passed since your symptoms started. And   ? You've had no fever--and no medicine that reduces fever--for 1 full day (24 hours). And   ? Your other symptoms have resolved (gotten better).  If you don't show symptoms, but testing showed that you have COVID-19:    Stay home and away from others (self-isolate) until at least 10 days have passed since the date of your first positive COVID-19 test.  During this time    Stay in your own room, even for meals. Use your own bathroom if you can.    Stay away from others in your home. No hugging, kissing or shaking hands. No visitors.    Don't go to work, school or anywhere else.    Clean \"high touch\" surfaces often (doorknobs, counters, handles). Use household cleaning spray or wipes. You'll find a full list of  on the EPA website: www.epa.gov/pesticide-registration/list-n-disinfectants-use-against-sars-cov-2.    Cover your mouth and nose with a mask or other face covering to avoid spreading germs.    Wash your hands and face often. Use soap and water.    Caregivers in these groups are at risk for severe illness due to COVID-19:  ? People 65 years and older  ? People who live in a nursing home or long-term care facility  ? People with chronic disease (lung, heart, cancer, diabetes, kidney, liver, immunologic)  ? People who have a weakened immune system, including those who:    Are in cancer treatment    Take medicine that weakens the immune system, such as corticosteroids    Had a bone marrow or organ transplant    Have an " immune deficiency    Have poorly controlled HIV or AIDS    Are obese (body mass index of 40 or higher)    Smoke regularly    Caregivers should wear gloves while washing dishes, handling laundry and cleaning bedrooms and bathrooms.    Use caution when washing and drying laundry: Don't shake dirty laundry and use the warmest water setting that you can.    For more tips on managing your health at home, go to www.cdc.gov/coronavirus/2019-ncov/downloads/10Things.pdf.  How can I take care of myself at home?  1. Get lots of rest. Drink extra fluids (unless a doctor has told you not to).  2. Take Tylenol (acetaminophen) for fever or pain. If you have liver or kidney problems, ask your family doctor if it's okay to take Tylenol.     Adults can take either:  ? 650 mg (two 325 mg pills) every 4 to 6 hours, or   ? 1,000 mg (two 500 mg pills) every 8 hours as needed.  ? Note: Don't take more than 3,000 mg in one day. Acetaminophen is found in many medicines (both prescribed and over-the-counter medicines). Read all labels to be sure you don't take too much.   For children, check the Tylenol bottle for the right dose. The dose is based on the child's age or weight.  3. If you have other health problems (like cancer, heart failure, an organ transplant or severe kidney disease): Call your specialty clinic if you don't feel better in the next 2 days.  4. Know when to call 911. Emergency warning signs include:  ? Trouble breathing or shortness of breath  ? Pain or pressure in the chest that doesn't go away  ? Feeling confused like you haven't felt before, or not being able to wake up  ? Bluish-colored lips or face  5. Your doctor may have prescribed a blood thinner medicine. Follow their instructions.  Where can I get more information?     [employer] [place] - About COVID-19: [email or web address]    CDC - What to Do If You're Sick: www.cdc.gov/coronavirus/2019-ncov/about/steps-when-sick.html    CDC - Ending Home Isolation:  www.cdc.gov/coronavirus/2019-ncov/hcp/disposition-in-home-patients.html    CDC - Caring for Someone: www.cdc.gov/coronavirus/2019-ncov/if-you-are-sick/care-for-someone.html    [facility] - Interim Guidance for Hospital Discharge to Home: www.[facility].[place]/diseases/coronavirus/hcp/hospdischarge.pdf    [facility] clinical trials (COVID-19 research studies): clinicalaffairs.[facility]-clinical-trials    Below are the COVID-19 hotlines at the [place] Department of Health ([facility]). Interpreters are available.  ? For health questions: Call 545-550-0577 or 1-989.624.8795 (7 a.m. to 7 p.m.)  ? For questions about schools and childcare: Call 022-493-5978 or 1-334.927.7846 (7 a.m. to 7 p.m.)    For informational purposes only. Not to replace the advice of your health care provider. Clinically reviewed by the Infection Prevention Team. Copyright   2020 [facility]. All rights reserved. [employer] [id number] - REV 08/04/20.

## 2020-12-08 ENCOUNTER — TRANSFERRED RECORDS (OUTPATIENT)
Dept: FAMILY MEDICINE | Facility: CLINIC | Age: 35
End: 2020-12-08

## 2020-12-09 LAB
CHOLESTEROL (EXTERNAL): 172 MG/DL (ref 100–199)
CREATININE (EXTERNAL): 0.87 MG/DL (ref 0.57–1)
GFR ESTIMATED (EXTERNAL): 0 ML/MIN/1.73M2
GFR ESTIMATED (IF AFRICAN AMERICAN) (EXTERNAL): 0 ML/MIN/1.73M2
HBA1C MFR BLD: 5.6 % (ref 4.8–5.6)
HDLC SERPL-MCNC: 40 MG/DL (ref 39–99)
LDL CHOLESTEROL (EXTERNAL): 0 MG/DL (ref 0–0)
LDL CHOLESTEROL CALCULATED (EXTERNAL): 90 MG/DL (ref 0–99)
NON HDL CHOLESTEROL (EXTERNAL): 0 MG/DL (ref 0–0)
PAP-ABSTRACT: NORMAL
TRIGLYCERIDES (EXTERNAL): 250 MG/DL (ref 0–149)
TSH SERPL-ACNC: 1.77 UIU/ML (ref 0.45–4.5)
VITAMIN D 25: 27.4 (ref 30–100)

## 2020-12-23 LAB — PAP-ABSTRACT: NORMAL

## 2021-01-14 ENCOUNTER — HEALTH MAINTENANCE LETTER (OUTPATIENT)
Age: 36
End: 2021-01-14

## 2021-09-10 ENCOUNTER — TELEPHONE (OUTPATIENT)
Dept: FAMILY MEDICINE | Facility: CLINIC | Age: 36
End: 2021-09-10

## 2021-09-10 NOTE — TELEPHONE ENCOUNTER
----- Message from Melida Puckett CMA sent at 9/10/2021  2:59 PM CDT -----  Regarding: need records  Patient see's Sees OBGYN- Dr Tashia Cevallos  Please get records of last pap sent to us    Thanks  Nga

## 2021-10-23 ENCOUNTER — HEALTH MAINTENANCE LETTER (OUTPATIENT)
Age: 36
End: 2021-10-23

## 2022-02-12 ENCOUNTER — HEALTH MAINTENANCE LETTER (OUTPATIENT)
Age: 37
End: 2022-02-12

## 2022-03-01 ENCOUNTER — TRANSFERRED RECORDS (OUTPATIENT)
Dept: FAMILY MEDICINE | Facility: CLINIC | Age: 37
End: 2022-03-01

## 2022-08-11 ENCOUNTER — OFFICE VISIT (OUTPATIENT)
Dept: FAMILY MEDICINE | Facility: CLINIC | Age: 37
End: 2022-08-11

## 2022-08-11 VITALS
OXYGEN SATURATION: 100 % | WEIGHT: 248.4 LBS | HEART RATE: 96 BPM | BODY MASS INDEX: 36.68 KG/M2 | RESPIRATION RATE: 16 BRPM | SYSTOLIC BLOOD PRESSURE: 122 MMHG | DIASTOLIC BLOOD PRESSURE: 68 MMHG

## 2022-08-11 DIAGNOSIS — S63.501A WRIST SPRAIN, RIGHT, INITIAL ENCOUNTER: ICD-10-CM

## 2022-08-11 DIAGNOSIS — G43.809 OTHER MIGRAINE WITHOUT STATUS MIGRAINOSUS, NOT INTRACTABLE: ICD-10-CM
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
## 2022-08-11 DIAGNOSIS — M25.531 RIGHT WRIST PAIN: ICD-10-CM

## 2022-08-11 PROCEDURE — 73110 X-RAY EXAM OF WRIST: CPT | Mod: RT | Performed by: FAMILY MEDICINE

## 2022-08-11 PROCEDURE — 99214 OFFICE O/P EST MOD 30 MIN: CPT | Performed by: FAMILY MEDICINE

## 2022-08-11 NOTE — PROGRESS NOTES
SUBJECTIVE:    Tisha Almeida, is a 36 year old female with remote Hx of Wilm's tumor (with surgically absent kidney) presenting for the below:     1. Fall backwards, down 3 wooden steps onto concrete 5 days ago, striking right wrist. Immediate pain and mild swelling. Since then wrist has been moderately painful, although she has been able to continue working with a keyboard. Right hand dominant.     2. Migraines. Two district types. Once every 3-4 months: has onset of feeling like something is floating in the left eye, followed by monocular blurred vision and left sided dull headache. Triggered by light. Vision change last for 30 minutes then resolves. 1-2 times per month onset of neck stiffness and jaw tightness followed by left temporal headache with associated photophobia. Can last for up to 2 days. Has lost days from work.     Reports one episode, 2 years ago, of headache with associated left sided facial weakness, slurred speech and left arm weakness. She did not seek medical attention at that time.     Received chemotherapy at age 10 for Wilm's tumor. Nephew with migraines.     OBJECTIVE:  Vitals:    08/11/22 1332   BP: 122/68   Pulse: 96   Resp: 16   SpO2: 100%   Weight: 112.7 kg (248 lb 6.4 oz)    Body mass index is 36.68 kg/m .    General: no acute distress, cooperative with exam.  Eyes: no injection or drainage.  Right upper extremity: partially resolving ecchymosis overlying distal radius. All digits of right hand neurovascularly intact. Good  strength. No tenderness on telescoping of thumb. Some point tenderness over distal radius. Intact pronation and supination with no pain elicited.  Neuro: grossly intact   Psych: mental status normal, mood and affect appropriate.    Xray right wrist : no acute fractures seen.     MRI brain 5/2016 : no abnormalities detected.     ASSESSMENT / PLAN:      Fall down stairs, subsequent encounter  Wrist sprain, right, initial encounter  Right wrist pain  No  radiological evidence of acute bony injury. In keeping with ligamentous sprain. Conservative management discussed inclusive of rest, ice, compression. She will consider wearing a wrist splint during keyboard work for the next few days.   -     XR Wrist Right G/E 3 Views; Future    Other migraine without status migrainosus, not intractable   MRI brain 5/2016 : no abnormalities detected, but with episode of headache associated with slurred speech and left sided lateralizing weakness in 2019, advised evaluation with headache clinic. Patient in agreement.   -     Adult Neurology  Referral; Future

## 2022-09-28 NOTE — TELEPHONE ENCOUNTER
FUTURE VISIT INFORMATION      FUTURE VISIT INFORMATION:    Date: 11/21/2022    Time: 2pm    Location: [facility]  REFERRAL INFORMATION:    Referring provider:  Dr. Larry    Referring providers clinic:  [facility]     Reason for visit/diagnosis  Migraines     RECORDS REQUESTED FROM:       Clinic name Comments Records Status Imaging Status   Internal Dr. Larry-8/11/2022 Epic No Images          [facility]  MR Brain-5/23/2016 Scanned to chart Requested                        11/17/2022-Request for images faxed to [facility]-MR @ 501am     11/21/2022-[facility] images now in PACS, reports scanned to chart-MR @ 516am

## 2022-10-10 ENCOUNTER — HEALTH MAINTENANCE LETTER (OUTPATIENT)
Age: 37
End: 2022-10-10

## 2022-11-19 ASSESSMENT — HEADACHE IMPACT TEST (HIT 6)
HOW OFTEN HAVE YOU FELT TOO TIRED TO WORK BECAUSE OF YOUR HEADACHES: VERY OFTEN
HOW OFTEN HAVE YOU FELT FED UP OR IRRITATED BECAUSE OF YOUR HEADACHES: VERY OFTEN
WHEN YOU HAVE HEADACHES HOW OFTEN IS THE PAIN SEVERE: VERY OFTEN
HIT6 TOTAL SCORE: 72
HOW OFTEN DO HEADACHES LIMIT YOUR DAILY ACTIVITIES: ALWAYS
HOW OFTEN DID HEADACHS LIMIT CONCENTRATION ON WORK OR DAILY ACTIVITY: ALWAYS
WHEN YOU HAVE A HEADACHE HOW OFTEN DO YOU WISH YOU COULD LIE DOWN: ALWAYS

## 2022-11-21 ENCOUNTER — PRE VISIT (OUTPATIENT)
Dept: NEUROLOGY | Facility: CLINIC | Age: 37
End: 2022-11-21

## 2022-11-21 ENCOUNTER — OFFICE VISIT (OUTPATIENT)
Dept: NEUROLOGY | Facility: CLINIC | Age: 37
End: 2022-11-21
Attending: FAMILY MEDICINE
Payer: COMMERCIAL

## 2022-11-21 VITALS
DIASTOLIC BLOOD PRESSURE: 88 MMHG | SYSTOLIC BLOOD PRESSURE: 138 MMHG | OXYGEN SATURATION: 98 % | HEART RATE: 88 BPM | RESPIRATION RATE: 16 BRPM

## 2022-11-21 DIAGNOSIS — G43.109 MIGRAINE WITH AURA AND WITHOUT STATUS MIGRAINOSUS, NOT INTRACTABLE: Primary | ICD-10-CM

## 2022-11-21 DIAGNOSIS — G43.809 OTHER MIGRAINE WITHOUT STATUS MIGRAINOSUS, NOT INTRACTABLE: ICD-10-CM

## 2022-11-21 PROCEDURE — 99203 OFFICE O/P NEW LOW 30 MIN: CPT | Performed by: NURSE PRACTITIONER

## 2022-11-21 RX ORDER — PROCHLORPERAZINE MALEATE 5 MG
5-10 TABLET ORAL EVERY 6 HOURS PRN
Qty: 20 TABLET | Refills: 3 | Status: SHIPPED | OUTPATIENT
Start: 2022-11-21 | End: 2022-11-21

## 2022-11-21 RX ORDER — PROCHLORPERAZINE MALEATE 5 MG
5-10 TABLET ORAL EVERY 6 HOURS PRN
Qty: 20 TABLET | Refills: 3 | Status: SHIPPED | OUTPATIENT
Start: 2022-11-21

## 2022-11-21 ASSESSMENT — PAIN SCALES - GENERAL: PAINLEVEL: NO PAIN (0)

## 2022-11-21 NOTE — PROGRESS NOTES
"ASSESSMENT AND PLAN:  Headache and reported headache symptoms appear to be migraine with aura   Once episode with left arm weakness and slurred speech and lasted for an hour and nephew gets similar -question hemiplegic migraine. Unfortunately did not get medical evaluation.   Plan:  Would avoid estrogen or estrogen containing products  Would avoid triptans for now as well  Call 911 with any stroke like symptoms.   May try vit B2 200 mg daily and magnesium 200-400 OTC   Rescue treatment -Ubrelvy as needed and limit use to no more than 9 days per month   May try naproxen 2 tabs every 12 hours +prochlorperazine (side effects -anxious or drowsiness) +benedryl for rescue treatment and Ok to take all the same day with Ubrelvy   Follow up as needed but at least for meds renew or sooner if needed     Myofascial tightness/neck tightness -sees a chiropractor and aware of no neck manipulations. Will try an oral appliance to see if helps.       Gary Giles is a 37 year old  accompanied by her mother, presenting for the following health issues:  Consult  Hx of Wilm's tumor (with surgically absent kidney)     HPI     Per PCP visit note 8/11/2022 \"Two district types. Once every 3-4 months: has onset of feeling like something is floating in the left eye, followed by monocular blurred vision and left sided dull headache. Triggered by light. Vision change last for 30 minutes then resolves. 1-2 times per month onset of neck stiffness and jaw tightness followed by left temporal headache with associated photophobia. Can last for up to 2 days. Has lost days from work\".     Confirmed with the patient PCP descriptions. Always on the left side -severe headache and can be on the right but not as bed. Sometimes nausea when pain is severe. Light and noise sensitivity.   Pain on the left a sharp and does not go away for 12 hours and feels like a \"migraine hang over\"   Tx-heat, ice, tylenol, Excedrin, naproxen,  ibuprofen did not " help  No triptans tried     Once 4 years ago -migraine like with left arm weakness and slurred vision and was fine within an hour and did not go to see anyone. Similar symptoms -nephew has     Usually once per month -a severe headache and mild-moderate -a couple times per week a milder headache.     Always has been the patter since 11 years old.     FH-headaches uncle on father's side, great uncle and nephew. Mother has some typical normal headache    No family history of clotting disorders     Daily neck pain and TMJ tightness -used device and will ask her dentist.     SH:  Works as a social workser thru [employer]         Objective    Resp 16   There is no height or weight on file to calculate BMI.  Physical Exam   GENERAL: healthy, alert and no distress  EYES: Eyes grossly normal to inspection, PERRL and conjunctivae and sclerae normal  NECK: no adenopathy, no asymmetry, masses, or scars and thyroid normal to palpation  RESP: lungs clear to auscultation - no rales, rhonchi or wheezes  CV: regular rate and rhythm, normal S1 S2, no S3 or S4, no murmur, click or rub, no peripheral edema and peripheral pulses strong  MS: no gross musculoskeletal defects noted, no edema  NEURO: Normal strength and tone, sensory exam grossly normal, mentation intact, cranial nerves 2-12 intact, DTR's normal and symmetric, gait normal including heel/toe/tandem walking and Romberg normal  PSYCH: mentation appears normal, affect normal    DATA: reviewed    Diagnostic Test Results:  A1C 5.3    I discussed all my recommendations with Tisha Almeida who verbalizes understanding and comfortable with the plan.  All of patient's questions were answered from the best of my knowledge.  Patient is in agreement with the plan.     37 minutes spent on the date of the encounter doing face to face, chart  review, exam, results review,  meds review, treatment plan, documentation and further activities as noted above    ALYSE Newman,  CNP [facility]  Headache certified  [facility]

## 2022-11-21 NOTE — LETTER
"11/21/2022       RE: Tisha Almeida  [address] [address] [address]     Dear Colleague,    Thank you for referring your patient, Tisha Almeida, to the [facility]UROLOG[facility] [place] [facility]. Please see a copy of my visit note below.    ASSESSMENT AND PLAN:  Headache and reported headache symptoms appear to be migraine with aura   Once episode with left arm weakness and slurred speech and lasted for an hour and nephew gets similar -question hemiplegic migraine. Unfortunately did not get medical evaluation.   Plan:  Would avoid estrogen or estrogen containing products  Would avoid triptans for now as well  Call 911 with any stroke like symptoms.   May try vit B2 200 mg daily and magnesium 200-400 OTC   Rescue treatment -Ubrelvy as needed and limit use to no more than 9 days per month   May try naproxen 2 tabs every 12 hours +prochlorperazine (side effects -anxious or drowsiness) +benedryl for rescue treatment and Ok to take all the same day with Ubrelvy   Follow up as needed but at least for meds renew or sooner if needed     Myofascial tightness/neck tightness -sees a chiropractor and aware of no neck manipulations. Will try an oral appliance to see if helps.       Gary Giles is a 37 year old  accompanied by her mother, presenting for the following health issues:  Consult  Hx of Wilm's tumor (with surgically absent kidney)     HPI     Per PCP visit note 8/11/2022 \"Two district types. Once every 3-4 months: has onset of feeling like something is floating in the left eye, followed by monocular blurred vision and left sided dull headache. Triggered by light. Vision change last for 30 minutes then resolves. 1-2 times per month onset of neck stiffness and jaw tightness followed by left temporal headache with associated photophobia. Can last for up to 2 days. Has lost days from work\".     Confirmed with the patient PCP " "descriptions. Always on the left side -severe headache and can be on the right but not as bed. Sometimes nausea when pain is severe. Light and noise sensitivity.   Pain on the left a sharp and does not go away for 12 hours and feels like a \"migraine hang over\"   Tx-heat, ice, tylenol, Excedrin, naproxen,  ibuprofen did not help  No triptans tried     Once 4 years ago -migraine like with left arm weakness and slurred vision and was fine within an hour and did not go to see anyone. Similar symptoms -nephew has     Usually once per month -a severe headache and mild-moderate -a couple times per week a milder headache.     Always has been the patter since 11 years old.     FH-headaches uncle on father's side, great uncle and nephew. Mother has some typical normal headache    No family history of clotting disorders     Daily neck pain and TMJ tightness -used device and will ask her dentist.     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  Physical Exam   GENERAL: healthy, alert and no distress  EYES: Eyes grossly normal to inspection, PERRL and conjunctivae and sclerae normal  NECK: no adenopathy, no asymmetry, masses, or scars and thyroid normal to palpation  RESP: lungs clear to auscultation - no rales, rhonchi or wheezes  CV: regular rate and rhythm, normal S1 S2, no S3 or S4, no murmur, click or rub, no peripheral edema and peripheral pulses strong  MS: no gross musculoskeletal defects noted, no edema  NEURO: Normal strength and tone, sensory exam grossly normal, mentation intact, cranial nerves 2-12 intact, DTR's normal and symmetric, gait normal including heel/toe/tandem walking and Romberg normal  PSYCH: mentation appears normal, affect normal    DATA: reviewed    Diagnostic Test Results:  A1C 5.3    I discussed all my recommendations with Tisha Almeida who verbalizes understanding and comfortable with the plan.  All " of patient's questions were answered from the best of my knowledge.  Patient is in agreement with the plan.     37 minutes spent on the date of the encounter doing face to face, chart  review, exam, results review,  meds review, treatment plan, documentation and further activities as noted above    ALYSE Newman, CNP [facility]  Headache certified  [facility]

## 2022-11-21 NOTE — PATIENT INSTRUCTIONS
Plan:  Would avoid estrogen or estrogen containing products  Would avoid triptans for now as well  Call 911 with any stroke like symptoms.   May try vit B2 200 mg daily and magnesium 200-400 OTC   Rescue treatment -Ubrelvy as needed and limit use to no more than 9 days per month   May try naproxen 2 tabs every 12 hours +prochlorperazine (side effects -anxious or drowsiness) +benedryl for rescue treatment and Ok to take all the same day with Ubrelvy   Follow up as needed but at least for meds renew or sooner if needed     Myofascial tightness/neck tightness -sees a chiropractor and aware of no neck manipulations. Will try an oral appliance to see if helps.

## 2022-11-22 ENCOUNTER — TELEPHONE (OUTPATIENT)
Dept: NEUROLOGY | Facility: CLINIC | Age: 37
End: 2022-11-22

## 2022-11-22 NOTE — TELEPHONE ENCOUNTER
Prior Authorization Approval    Authorization Effective Date: 10/22/2022  Authorization Expiration Date: 11/21/2023  Medication: Ubrelvy 50 mg-APPROVED  Approved Dose/Quantity:   Reference #:     Insurance Company: [employer] - Phone [phone] Fax [phone]  Expected CoPay:       CoPay Card Available:      Foundation Assistance Needed:    Which Pharmacy is filling the prescription (Not needed for infusion/clinic administered): [id number] - [place], [place] - [address] AT [facility] [address] [facility]  Pharmacy Notified: Yes  Patient Notified: No

## 2022-12-01 ENCOUNTER — OFFICE VISIT (OUTPATIENT)
Dept: FAMILY MEDICINE | Facility: CLINIC | Age: 37
End: 2022-12-01

## 2022-12-01 VITALS
HEIGHT: 70 IN | HEART RATE: 80 BPM | WEIGHT: 242 LBS | BODY MASS INDEX: 34.65 KG/M2 | SYSTOLIC BLOOD PRESSURE: 136 MMHG | DIASTOLIC BLOOD PRESSURE: 82 MMHG | OXYGEN SATURATION: 98 %

## 2022-12-01 DIAGNOSIS — R07.89 CHEST DISCOMFORT: ICD-10-CM

## 2022-12-01 DIAGNOSIS — R00.2 PALPITATIONS: Primary | ICD-10-CM

## 2022-12-01 DIAGNOSIS — R79.89 ELEVATED D-DIMER: ICD-10-CM

## 2022-12-01 DIAGNOSIS — Z13.1 SCREENING FOR DIABETES MELLITUS: ICD-10-CM

## 2022-12-01 LAB
% GRANULOCYTES: 66.2 % (ref 42.2–75.2)
D-DIMER: 520 NG/ML (ref 0–100)
HBA1C MFR BLD: 5.5 % (ref 4–6)
HCT VFR BLD AUTO: 37.9 % (ref 35–46)
HEMOGLOBIN: 13 G/DL (ref 11.8–15.5)
LYMPHOCYTES NFR BLD AUTO: 27 % (ref 20.5–51.1)
MCH RBC QN AUTO: 28.6 PG (ref 27–31)
MCHC RBC AUTO-ENTMCNC: 34.4 G/DL (ref 33–37)
MCV RBC AUTO: 83 FL (ref 80–100)
MONOCYTES NFR BLD AUTO: 6.8 % (ref 1.7–9.3)
PLATELET # BLD AUTO: 249 K/UL (ref 140–450)
RBC # BLD AUTO: 4.56 X10/CMM (ref 3.7–5.2)
WBC # BLD AUTO: 7.4 X10/CMM (ref 3.8–11)

## 2022-12-01 PROCEDURE — 36415 COLL VENOUS BLD VENIPUNCTURE: CPT | Performed by: NURSE PRACTITIONER

## 2022-12-01 PROCEDURE — 85025 COMPLETE CBC W/AUTO DIFF WBC: CPT | Performed by: NURSE PRACTITIONER

## 2022-12-01 PROCEDURE — 93000 ELECTROCARDIOGRAM COMPLETE: CPT | Performed by: NURSE PRACTITIONER

## 2022-12-01 PROCEDURE — 85379 FIBRIN DEGRADATION QUANT: CPT | Mod: QW | Performed by: NURSE PRACTITIONER

## 2022-12-01 PROCEDURE — 83036 HEMOGLOBIN GLYCOSYLATED A1C: CPT | Performed by: NURSE PRACTITIONER

## 2022-12-01 PROCEDURE — 99214 OFFICE O/P EST MOD 30 MIN: CPT | Performed by: NURSE PRACTITIONER

## 2022-12-01 NOTE — PROGRESS NOTES
"Problem(s) Oriented visit        SUBJECTIVE:                                                    Tisha Almeida is a 37 year old female who presents to clinic today for the following health issues :    Heart feels like it has been fluttering for the last 5 days pretty constantly. Has not had this sensation in the past. Can feel it back of base of throat and mid sternum. Denies chest pain or pressure, just a weird sensation. Right arm hurt yesterday but thinks that was from shoveling. No increase in caffeine intake or changes in diet. Doesn't think related to acid reflux since she takes omeprazole every night before bed. Is on estrogen-containing contraceptive (nuvaring) and has elevated blood pressure and obesity. Supposed to go on trip to [place] in 2 days. Denies fever, chills, night sweats, shortness of breath, leg pain or swelling. No history of DVT, PE, cardiac issues.     Problem list, Medication list, Allergies, and Medical/Social/Surgical histories reviewed in [facility] and updated as appropriate.   Additional history: as documented    ROS:  5 point ROS completed and negative except noted above, including Gen, CV, Resp, GI, MS    OBJECTIVE:                                                    /82   Pulse 80   Ht 1.765 m (5' 9.5\")   Wt 109.8 kg (242 lb)   SpO2 98%   BMI 35.22 kg/m    Body mass index is 35.22 kg/m .   GENERAL: healthy, alert and no distress  RESP: lungs clear to auscultation - no rales, rhonchi or wheezes  CV: regular rate and rhythm, normal S1 S2, no S3 or S4, no murmur, click or rub, no peripheral edema and peripheral pulses strong  MS: extremities normal- no gross deformities noted  SKIN: no suspicious lesions or rashes  PSYCH: normal affect & mood    12-lead EKG: NSR without ectopy  D-Dimer 0 - 100 ng/mL 520 Abnormal       CBC RESULTS: Recent Labs   Lab Test 12/01/22  1409 08/01/16  1057 03/24/15  0900   WBC 7.4   < > 11.2*   RBC 4.56   < > 5.06   HGB 13.0   < > 14.7   HCT 37.9   < > " 43.4   MCV 83.0   < > 86   MCH 28.6   < > 29.1   MCHC 34.4   < > 33.9   RDW  --   --  13.4      < > 204    < > = values in this interval not displayed.        ASSESSMENT/PLAN:                                                      Tisha was seen today for heart problem.    Diagnoses and all orders for this visit:    Palpitations  -     EKG 12-lead complete w/read - Clinics  -     CBC with Diff/Plt ([facility])  -     TSH ([facility])  -     Comp. Metabolic Panel (14) ([facility])  -     VENOUS COLLECTION  -     D-Dimer ([facility])    Chest discomfort  -     EKG 12-lead complete w/read - Clinics  -     D-Dimer ([facility])  -     VENOUS COLLECTION  -     CT Chest w/o Contrast; Future  -     D-Dimer ([facility])    Elevated d-dimer  -     CT Chest w/o Contrast; Future  -     D-Dimer ([facility])    Screening for diabetes mellitus  -     Hemoglobin A1C ([facility])  -     VENOUS COLLECTION    Low concern for cardiac etiology of symptoms but do have concern for possible pulmonary embolism due to slightly elevated D-dimer, chest symptoms, elevated blood pressure, obesity, and being on estrogen containing birth control. Discussed lab results with patient and  will help her set up CT chest. Discussed that if PE is present, I will recommend she go to the ED for further evaluation.  CT chest set up at [facility] tomorrow @ 4:40 pm    See Patient Instructions  Patient Instructions   Due to current symptoms and slightly elevated D-dimer, I recommend you have CT scan done of your chest to make sure you do not have a pulmonary embolism, which is a blood clot in the lung. I will call you with results once I get them.       ALYSE Carrasquillo CNP  [facility]  Family Practice  [facility]  [phone]    For any issues my office # is [phone]

## 2022-12-01 NOTE — PATIENT INSTRUCTIONS
Due to current symptoms and slightly elevated D-dimer, I recommend you have CT scan done of your chest to make sure you do not have a pulmonary embolism, which is a blood clot in the lung. I will call you with results once I get them.

## 2022-12-02 ENCOUNTER — HOSPITAL ENCOUNTER (OUTPATIENT)
Dept: CT IMAGING | Facility: CLINIC | Age: 37
Discharge: HOME | End: 2022-12-02
Attending: NURSE PRACTITIONER | Admitting: NURSE PRACTITIONER
Payer: COMMERCIAL

## 2022-12-02 DIAGNOSIS — R07.89 CHEST DISCOMFORT: ICD-10-CM

## 2022-12-02 DIAGNOSIS — J18.9 PNEUMONIA OF RIGHT UPPER LOBE DUE TO INFECTIOUS ORGANISM: Primary | ICD-10-CM

## 2022-12-02 DIAGNOSIS — R79.89 ELEVATED D-DIMER: ICD-10-CM

## 2022-12-02 LAB
ALBUMIN SERPL-MCNC: 4.2 G/DL (ref 3.8–4.8)
ALBUMIN/GLOB SERPL: 1.6 {RATIO} (ref 1.2–2.2)
ALP SERPL-CCNC: 72 IU/L (ref 44–121)
ALT SERPL-CCNC: 6 IU/L (ref 0–32)
AST SERPL-CCNC: 10 IU/L (ref 0–40)
BILIRUB SERPL-MCNC: <0.2 MG/DL (ref 0–1.2)
BUN SERPL-MCNC: 7 MG/DL (ref 6–20)
BUN/CREATININE RATIO: 9 (ref 9–23)
CALCIUM SERPL-MCNC: 9.8 MG/DL (ref 8.7–10.2)
CHLORIDE SERPLBLD-SCNC: 104 MMOL/L (ref 96–106)
CREAT SERPL-MCNC: 0.82 MG/DL (ref 0.57–1)
D DIMER MG/L FEU: 0.78 MG/L FEU (ref 0–0.49)
EGFR: 93 ML/MIN/1.73
GLOBULIN, TOTAL: 2.6 G/DL (ref 1.5–4.5)
GLUCOSE SERPL-MCNC: 94 MG/DL (ref 70–99)
POTASSIUM SERPL-SCNC: 4.1 MMOL/L (ref 3.5–5.2)
PROT SERPL-MCNC: 6.8 G/DL (ref 6–8.5)
SODIUM SERPL-SCNC: 140 MMOL/L (ref 134–144)
TOTAL CO2: 21 MMOL/L (ref 20–29)
TSH BLD-ACNC: 1.79 UIU/ML (ref 0.45–4.5)

## 2022-12-02 PROCEDURE — 71250 CT THORAX DX C-: CPT

## 2022-12-02 RX ORDER — AZITHROMYCIN 250 MG/1
TABLET, FILM COATED ORAL
Qty: 6 TABLET | Refills: 0 | Status: SHIPPED | OUTPATIENT
Start: 2022-12-02 | End: 2022-12-07

## 2022-12-02 NOTE — PATIENT INSTRUCTIONS
Take Augmentin (Amoxicillin-Clavulanic acid) 1 tab twice daily for 10 days.  Watch out for any severe diarrhea due to antibiotic.  Take Culturelle or an over the counter probiotic, 1 capsule twice daily if this occurs.  Stop antibiotic AND call clinic if severe.      Take Zithromax Zpack for 5 days.  You take 2 tabs together the 1st day and then 1 tablet once daily for the following 4 straight days.   Watch out for an upset stomach or diarrhea.

## 2023-02-18 ENCOUNTER — HEALTH MAINTENANCE LETTER (OUTPATIENT)
Age: 38
End: 2023-02-18

## 2023-06-05 ENCOUNTER — OFFICE VISIT (OUTPATIENT)
Dept: SURGERY | Facility: CLINIC | Age: 38
End: 2023-06-05
Payer: COMMERCIAL

## 2023-06-05 VITALS
DIASTOLIC BLOOD PRESSURE: 88 MMHG | HEIGHT: 70 IN | SYSTOLIC BLOOD PRESSURE: 135 MMHG | WEIGHT: 247 LBS | BODY MASS INDEX: 35.36 KG/M2 | HEART RATE: 98 BPM | OXYGEN SATURATION: 97 %

## 2023-06-05 DIAGNOSIS — E66.09 CLASS 2 OBESITY DUE TO EXCESS CALORIES WITHOUT SERIOUS COMORBIDITY WITH BODY MASS INDEX (BMI) OF 35.0 TO 35.9 IN ADULT: Primary | ICD-10-CM

## 2023-06-05 DIAGNOSIS — Z98.84 BARIATRIC SURGERY STATUS: ICD-10-CM

## 2023-06-05 DIAGNOSIS — E66.812 CLASS 2 OBESITY DUE TO EXCESS CALORIES WITHOUT SERIOUS COMORBIDITY WITH BODY MASS INDEX (BMI) OF 35.0 TO 35.9 IN ADULT: Primary | ICD-10-CM

## 2023-06-05 DIAGNOSIS — Z46.51 FITTING AND ADJUSTMENT OF GASTRIC LAP BAND: ICD-10-CM

## 2023-06-05 PROCEDURE — S2083 ADJUSTMENT GASTRIC BAND: HCPCS | Performed by: PHYSICIAN ASSISTANT

## 2023-06-05 PROCEDURE — 99203 OFFICE O/P NEW LOW 30 MIN: CPT | Performed by: PHYSICIAN ASSISTANT

## 2023-06-05 NOTE — ASSESSMENT & PLAN NOTE
Deep Figueroa  on 5/26/2011  Band is in red zone.  Fluid removed today.      Will schedule annual visit with diet, PA-C

## 2023-06-05 NOTE — PATIENT INSTRUCTIONS
"Nice to talk with you today. Thank you for allowing me the privilege of caring for you. We hope we provided you with the excellent service you deserve.     To ensure the quality of our services you may receive a patient satisfaction survey from an independent monitoring company.  The greatest compliment you can give is \"Likely to Recommend\"    Below are your post band instructions.      Follow Up:  1 month for another band assessment.     -DANIEL Rivero    Band Post-Adjustment Instructions    After an adjustment, it is very important to change your diet to full liquids for 2-3 days afterwards.  If this consistency goes down well, advance your diet as tolerated back to solids over the next couple of days.    If you are in the red zone.  Please call [phone] and come back to the clinic right away so we can remove some fluid.    Green Zone:  Not hungry  Losing 1-2 lbs a week  Portion Control  Patient satisfaction  Red Zone:  reflux  vomiting  pain when eating, night cough  inability to eat solids  poor weight loss  Yellow Zone:  hungry between meals  not losing weight  eating larger portions   "

## 2023-06-05 NOTE — PROGRESS NOTES
Band Assessment Visit    Date: 2023  Name: Tisha Almeida  MR#: [id number]  : 1985    VITALS:          Weight: 247 lbs 0 oz         BMI: Body mass index is 35.95 kg/m .         Wt change since last visit (lbs): 6         Cumulative weight loss (lbs): -6    SUBJECTIVE:  Patient comes to the clinic today for band assessment.  Lap band by Dr. Figueroa on 2011. Was last seen in clinic in .  Last seen 2014 for band adjustment.  In regards to the patient's band, the patient feels they need fluid removed from their band.  Has been in the red zone for the last 2 years or more.  Even liquids are causing a problem.  Vomiting most days with both solids and liquids.    WEIGHT SELF ASSESSMENT:      2023     3:19 PM   --   Please check the boxes (1-3 boxes) which you think have been influencing your weight in the past month. The band is not in the right setting       CURRENT EXERCISE AND ACTIVITY:      2023     3:19 PM   --   Please select the statement that best describes your ability to exercise and be active in the past 4 weeks or since your last clinic visit I am as active as I can possibly be       BAND ROS:      2023     3:19 PM   --   I am hungry between meals? No   I am eating between meals? No   I am eating > 1 cup of food at meals? Yes   I am not losing 1-2 lbs a week? Yes   I do not feel a sense of restriction? No           2023     3:19 PM   --   I have pain when swallowing foods or liquids. Yes   I have heart burn, vomiting, or reflux. Yes, nighttime reflux, heartburn   I have night cough or hiccups. No   I am making poor food choices (smoothies, shakes, chips). No   I am unable to eat chicken, steak, and bread. Yes           2023     3:19 PM   --   Are you pregnant? No   Will you be traveling to remote areas? No   Will you be having major surgery soon? No        PE:   /88 (BP Location: Left arm, Patient Position: Chair, Cuff Size: Adult Large)   Pulse 98   Ht  "5' 9.5\" (1.765 m)   Wt 247 lb (112 kg)   SpO2 97%   BMI 35.95 kg/m    HEART: No JVD  LUNGS: Breathing unlabored.  ABD: Soft, NT, incisions well healed, port easily palpable  SKIN: No intertriginous irritation under pannus  MUSC: Gait normal  NEURO: Alert and oriented x3.    Assessment & Plan   Problem List Items Addressed This Visit     Bariatric surgery status     Lap band Dr. Figueroa  on 5/26/2011  Band is in red zone.  Fluid removed today.      Will schedule annual visit with diet, PA-C         Relevant Orders    Lap Band Adjustment - Clinic (Completed)    Class 2 obesity due to excess calories without serious comorbidity with body mass index (BMI) of 35.0 to 35.9 in adult - Primary     Pt to follow up for annual visit.  Will discuss medical weight loss options at that visit.         Other Visit Diagnoses     Fitting and adjustment of gastric lap band        Relevant Orders    Lap Band Adjustment - Clinic (Completed)          PLAN: After evaluation, we have elected to adjust her gastric band. Risk, benefits, and alternatives were reviewed before a consent was signed. Tisha wishes to proceed and will follow up in 4 weeks  for subsequent assessment.    PROCEDURE: Adjustment of gastric band performed by Josefa Diaz PA-C    PROCEDURE DETAILS: In the clinic exam room, the patient was placed in supine position on the exam table. The area over the access port was prepped with an alcohol swab, gloves were donned, and a Dyson needle and syringe were directed into the port under palpation guidance. 7.5 ml of saline was aspirated to verify amount in band, all but 2.6 mls was reinserted.  Access needle was withdrawn and bandaid was applied. Patient sat up and drank 4 ounces of lukewarm water and felt it was still to tight.       The area over the access port was prepped with an alcohol swab, gloves were donned, and a Dyson needle and syringe were directed into the port under palpation guidance. 1 ml of fluid ws " removed.   Access needle was withdrawn and bandaid was applied. Patient sat up and drank 4 ounces of lukewarm water without difficulty.  She should return to a liquid diet and advance as tolerated. Tight band warning signs were reviewed.  She left home in a stable and ambulatory condition.     Total fluid in band at end of procedure 3.9 ml.     FOLLOW-UP:  Return to clinic in 1 month.     35 minutes spent on the date of the encounter doing chart review, history and exam, result review, counseling, developing plan of care, documentation, and further activities as noted

## 2023-06-27 ENCOUNTER — OFFICE VISIT (OUTPATIENT)
Dept: SURGERY | Facility: CLINIC | Age: 38
End: 2023-06-27
Payer: COMMERCIAL

## 2023-06-27 VITALS
HEART RATE: 88 BPM | OXYGEN SATURATION: 100 % | WEIGHT: 248 LBS | DIASTOLIC BLOOD PRESSURE: 88 MMHG | SYSTOLIC BLOOD PRESSURE: 139 MMHG | BODY MASS INDEX: 35.5 KG/M2 | HEIGHT: 70 IN

## 2023-06-27 DIAGNOSIS — Z98.84 BARIATRIC SURGERY STATUS: Primary | ICD-10-CM

## 2023-06-27 DIAGNOSIS — E66.09 CLASS 2 OBESITY DUE TO EXCESS CALORIES WITHOUT SERIOUS COMORBIDITY WITH BODY MASS INDEX (BMI) OF 36.0 TO 36.9 IN ADULT: ICD-10-CM

## 2023-06-27 DIAGNOSIS — E66.812 CLASS 2 OBESITY DUE TO EXCESS CALORIES WITHOUT SERIOUS COMORBIDITY WITH BODY MASS INDEX (BMI) OF 36.0 TO 36.9 IN ADULT: ICD-10-CM

## 2023-06-27 PROCEDURE — 99212 OFFICE O/P EST SF 10 MIN: CPT | Performed by: PHYSICIAN ASSISTANT

## 2023-06-27 NOTE — PROGRESS NOTES
"BAND ASSESSMENT VISIT  June 27, 2023        VITALS: /88 (BP Location: Left arm, Patient Position: Chair, Cuff Size: Adult Large)   Pulse 88   Ht 5' 9.5\" (1.765 m)   Wt 248 lb (112.5 kg)   SpO2 100%   BMI 36.10 kg/m              SUBJECTIVE:  Patient comes to the clinic today for band assessment.  Was last seen by Josefa Diaz PA-C on 6/5/2023 and had fluid removed due to several years of vomiting. Feeling much better today. Denies any heartburn or vomiting since fluid was removed. Patient is in just 3 weeks after last fill inquiring about getting fluid added.         BAND ROS:  Hungry between meals:    Yes  Eating between meals:    Yes  Eat >1 cup of food at meals:    No  Not losing 1-2 lbs a week:    Yes  Not feeling sense of restriction:   Yes    Have pain when swallowing:    No  Have heartburn, vomiting or reflux:   No  Have night cough or hiccups:   No  Making poor food choices:    No  Unable to eat chicken, steak and bread: No    Is pregnant      No  Will be traveling to remote areas   No  Will have surgery soon.   No      ASSESSMENT:  1.  S/P adjustable band surgery  2.  Malnutrition following GI Surgery      PLAN: After evaluation, we have elected to defer adjusting her gastric band given it has been only 3 weeks since fluid was taken out. Offered to put in a small amount but after discussion patient agreeable to holding off allowing for furthered healing.  Is currently scheduled for annual in September with Candi Fairbanks PA-C. Will change appointment to annual/band adjustment with either Josefa Diaz PA-C or Anuel Melendez PA-C in 2-3 months. Patient is flexible and will look for cancellations to get in sooner.      Anuel Melendez MS, PA-C    10 minutes spent on the date of the encounter doing chart review, review of test results, patient visit and documentation     "

## 2023-06-27 NOTE — PATIENT INSTRUCTIONS
"Nice to talk with you today! Thank you for allowing me the privilege of caring for you.   We hope we provided you with the excellent service you deserve.     To ensure the quality of our services you may receive a patient satisfaction survey from an independent monitoring company.  The greatest compliment you can give is \"Likely to Recommend\"      Below is our plan we discussed.-  DANIEL Agee        Please schedule an annual/band adjustment with Josefa Melendez PA-C in 3 months.  If you need to reach me sooner you can do so by calling [phone].    Have a great day!     "

## 2023-08-29 ENCOUNTER — TELEPHONE (OUTPATIENT)
Dept: SURGERY | Facility: CLINIC | Age: 38
End: 2023-08-29

## 2023-08-29 NOTE — TELEPHONE ENCOUNTER
"Returned pt's call re: recently tested positive for Covid today- has an upcoming appointment on 9/6.  Pt reports that she was feeling mild \"head cold\" type symptoms- congestion, HA, & sniffles.  Was as supposed to have an in-person work meeting today.  To be on the safe side, pt took and at home test and was incidentally positive.    Will verify if patient can still come in on 9/6 with clinic manager and get back to patient.  If not, will help reschedule appointment.    Joan CORBIN RN    "

## 2023-08-29 NOTE — TELEPHONE ENCOUNTER
Patient tested positive for Covid today & is scheduled for in person visits on with Candi Fairbanks & Tisha. Please advise if patient needs to reschedule. Patient stated she needs band adjustment.    [phone] okay to leave VM

## 2023-09-06 ENCOUNTER — VIRTUAL VISIT (OUTPATIENT)
Dept: SURGERY | Facility: CLINIC | Age: 38
End: 2023-09-06
Payer: COMMERCIAL

## 2023-09-06 VITALS — HEIGHT: 70 IN | BODY MASS INDEX: 35.79 KG/M2 | WEIGHT: 250 LBS

## 2023-09-06 DIAGNOSIS — E66.812 CLASS 2 OBESITY DUE TO EXCESS CALORIES WITHOUT SERIOUS COMORBIDITY WITH BODY MASS INDEX (BMI) OF 36.0 TO 36.9 IN ADULT: ICD-10-CM

## 2023-09-06 DIAGNOSIS — E66.09 CLASS 2 OBESITY DUE TO EXCESS CALORIES WITHOUT SERIOUS COMORBIDITY WITH BODY MASS INDEX (BMI) OF 36.0 TO 36.9 IN ADULT: ICD-10-CM

## 2023-09-06 DIAGNOSIS — Z98.84 BARIATRIC SURGERY STATUS: Primary | ICD-10-CM

## 2023-09-06 PROCEDURE — 97803 MED NUTRITION INDIV SUBSEQ: CPT | Mod: VID

## 2023-09-06 NOTE — PROGRESS NOTES
"Tisha is a 37 year old who is being evaluated via a billable video visit.      How would you like to obtain your AVS? MyChart  If the video visit is dropped, the invitation should be resent by: Text to cell phone: [phone]  Will anyone else be joining your video visit? No            Video-Visit Details    Type of service:  Video Visit     Originating Location (pt. Location): Home    Distant Location (provider location):  On-site  Platform used for Video Visit: [facility]       NUTRITION POST OP APPOINTMENT + Re-establish Care  DATE OF VISIT: September 6, 2023    Tisha Almeida  1985  female  [id number]  37 year old     ASSESSMENT:    REASON FOR VISIT:  Tisha is a 37 year old year old female presents today for 12 year PO nutrition follow-up appointment. Patient is accompanied by self.    DIAGNOSIS:  Status post laparoscopic band surgery.  Obesity Obesity Grade II BMI >35 kg/m2    ANTHROPOMETRICS:  Initial Weight: ~280    Height: 69.5\"   Current Weight: 250 lbs 0 oz     BMI: Body mass index is 36.39 kg/m .    VITAMINS AND MINERALS:  None    NUTRITION HISTORY:  Breakfast: [wakes at 6-6:15am, eats at 7:30-8am] overnight oats  greek yogurt + fruit + granola  egg bites   Lunch: [11:30-12pm] 1/2 bagged salad + added chicken or steak   Supper: [5:30-6pm] tacos/taco salad  grilled ham and cheese sandwich + salad  turkey breast + potatoes/rice + vegetable/salad   Snacks: [between meals] beef stick, cheese stick, crackers  [evenings] sweets - ice cream  Fluids consumed: Water (90oz), coffee (1 cup w/chobani creamer), diet soda   Consuming liquid calories: Yes  Protein intake: 60-90 grams/day  Tolerate regular texture food: Yes  Portion size: 1 cup or more (2 cups)  Take 20-30 minutes to consume each meal: Yes   Eat protein foods first: Yes  Fluids and meals separate by at least 30 minutes: sometimes (not as much since having fluid removed)  Chew foods thoroughly: Yes  Tolerating diet: Yes  Drinking high " protein supplements: No  Consuming snacks per day: 1-2  Additional Information: Pt s/p lap band with Dr. Schafer in 2011, lost to RD follow up since 2014. Earlier this year, she had most of the fluid in her band removed in order to improving N/V. Planning on upcoming band fill in near future. Reviewed vitamin/mineral needs and behavior guidelines.         PHYSICAL ACTIVITY:  Type: walking dog, biking, pickleball, occasional exercise video   Frequency (days per week): 4  Duration (min):      DIAGNOSIS:  Previous Nutrition Diagnosis: Altered gastrointestinal function related to alteration in gastrointestinal structure as evidenced by history of laparoscopic band surgery.- no change    Previous goals:  (Remote)    Current Nutrition Diagnosis: Altered gastrointestinal function related to alteration in gastrointestinal structure as evidenced by history of laparoscopic band surgery.    INTERVENTION:   Nutrition Prescription: Eat 3 meals a day at regular intervals. Consume 60-90 grams of protein daily. Follow post-surgical vitamin and mineral protocol.  Assessed learning needs and learning preferences.    GOALS:  Take all post-op vitamins/minerals per guidelines  Separate fluids from meals  Add in a protein supplement  Have 3 food groups per meal  Strength train 2-3x/week    Follow-Up:   Recommend q2-3m follow up visits to assist with lifestyle changes or per insurance.  Implementation: Discussed progress toward previous goals; reinforced importance of following bariatric lifestyle changes.    NUTRITION MONITORING AND EVALUATION:  Anticipated compliance: good  Verbalized good understanding.    Follow up: Patient to follow up in 2-3 months.    TIME SPENT WITH PATIENT:  19 minutes        Tisha El RD, LD  Clinical Dietitian

## 2023-09-06 NOTE — PATIENT INSTRUCTIONS
Andrew Giles!    Great meeting you today! Here's the goals we discussed:      Take all vitamins/minerals per guidelines  [email or web address]  - Take a daily multivitamin meeting the criteria in the above handout. Women's One a Day works great  - Calcium: 500-600mg 2x/day. Take at least 2 hours apart from your multivitamin  - Vitamin D: 2000 international unit(s) per day    Avoid sipping/drinking during your meals and snacks, as well as for 30 minutes after      Aim for 3 food groups per meal:  1/2c protein + 1/4c vegetable + 1/4c fruit or starch      Have a daily protein drink  - Aim for a total protein intake of 60-90g per day      Strength train 2-3x/week        Let's plan on following up in 2-3 months. This can be scheduled via our call center at . Have a great week and reach out with any questions or concerns!      Tisha El, JAYY, LD  Clinical Dietitian

## 2023-09-12 ENCOUNTER — OFFICE VISIT (OUTPATIENT)
Dept: SURGERY | Facility: CLINIC | Age: 38
End: 2023-09-12
Payer: COMMERCIAL

## 2023-09-12 DIAGNOSIS — K91.2 POSTSURGICAL MALABSORPTION: ICD-10-CM

## 2023-09-12 DIAGNOSIS — Z98.84 BARIATRIC SURGERY STATUS: Primary | ICD-10-CM

## 2023-09-12 DIAGNOSIS — E66.3 OVERWEIGHT WITH BODY MASS INDEX (BMI) OF 29 TO 29.9 IN ADULT: ICD-10-CM

## 2023-09-12 DIAGNOSIS — Z46.51 FITTING AND ADJUSTMENT OF GASTRIC LAP BAND: ICD-10-CM

## 2023-09-12 PROCEDURE — S2083 ADJUSTMENT GASTRIC BAND: HCPCS | Performed by: PHYSICIAN ASSISTANT

## 2023-09-12 PROCEDURE — 99207 PR NO CHARGE LOS: CPT | Performed by: PHYSICIAN ASSISTANT

## 2023-09-12 NOTE — PROGRESS NOTES
Band Assessment Visit    Date: 2023  Name: Tisha Almeida  MR#: [id number]  : 1985    VITALS:          Weight: 206 lbs 0 oz         BMI: Body mass index is 29.98 kg/m .                        SUBJECTIVE:  Patient comes to the clinic today for band assessment.  Was last seen 2023. This was just 3 weeks after she had fluid taken out due years of vomiting. Decided to hold off on a band fill at that time. Doing great now. Has no vomiting or heartburn. Also has no restriction. Has gained 10 lbs since . Met with diet last week. In regards to the patient's band, the patient feels they need fluid added to their band.       WEIGHT SELF ASSESSMENT:      2023    10:51 AM   --   Please check the boxes (1-3 boxes) which you think have been influencing your weight in the past month. The band is not in the right setting       CURRENT EXERCISE AND ACTIVITY:      2023    10:51 AM   --   Please select the statement that best describes your ability to exercise and be active in the past 4 weeks or since your last clinic visit I have recently been sick and this has limited my ability to exercise and be active     Walks 4-5 times weekly. Plays pickle ball twice weekly. Rides bike on weekends. Will be starting strength training soon    BAND ROS:      2023    10:51 AM   --   I am hungry between meals? Yes   I am eating between meals? Yes   I am eating > 1 cup of food at meals? Yes   I am not losing 1-2 lbs a week? Yes   I do not feel a sense of restriction? Yes           2023    10:51 AM   --   I have pain when swallowing foods or liquids. No   I have heart burn, vomiting, or reflux. No   I have night cough or hiccups. No   I am making poor food choices (smoothies, shakes, chips). No   I am unable to eat chicken, steak, and bread. No           2023    10:51 AM   --   Are you pregnant? No   Will you be traveling to remote areas? No   Will you be having major surgery soon? No        ASSESSMENT:    1.  S/P adjustable band surgery  2.  Malnutrition following GI Surgery    PLAN: After evaluation, we have elected to adjust her gastric band. Risk, benefits, and alternatives were reviewed before a consent was signed. Tisha wishes to proceed and will follow up in 4 weeks  for subsequent assessment.    PROCEDURE: Adjustment of gastric band performed by Anuel Melendez PA-C      PROCEDURE DETAILS: In the clinic exam room, the patient was placed in supine position on the exam table. The area over the access port was prepped with an alcohol swab, gloves were donned, and a Dyson needle and syringe were directed into the port under palpation guidance. A small amount of saline was aspirated to verify location, and 1.0 mls was delivered into the port. Access needle was withdrawn and bandaid was applied. Patient sat up and drank 4 ounces of lukewarm water without difficulty. She should return to a liquid diet and advance as tolerated. Tight band warning signs were reviewed.  She left home in a stable and ambulatory condition.     Patient is scheduled with Josefa Diaz PA-C next month. Can do adjustment if needed but also can talk about weight loss medications.    Anuel Melendez MS, PA-C

## 2023-09-12 NOTE — PATIENT INSTRUCTIONS
"Nice to talk with you today! Thank you for allowing me the privilege of caring for you.   We hope we provided you with the excellent service you deserve.     To ensure the quality of our services you may receive a patient satisfaction survey from an independent monitoring company.  The greatest compliment you can give is \"Likely to Recommend\"      Below is our plan we discussed.-  DANIEL Agee        Please call [phone] and schedule a follow up with Anuel Melendez PA-C  or Josefa Diaz for November.  If you need to reach me sooner you can do so by calling [phone].    Have a great day!     "

## 2023-09-30 NOTE — PROGRESS NOTES
Band Assessment Visit    Date: 2023  Name: Tisha Almeida  MR#: [id number]  : 1985    VITALS:          Weight: 256 lbs 0 oz         BMI: Body mass index is 37.26 kg/m .         Wt change since last visit (lbs): -4         Cumulative weight loss (lbs): -27.7    SUBJECTIVE:  Patient comes to the clinic today for band assessment.  Was seen in Sept for band assessment by partner, KATHLEEN Melendez PA-C.  1 ml of fluid added.  Previously we had removed fluid due to years of vomiting. Decided to hold off on a band fill at that time. Doing great now. Has no vomiting or heartburn. In regards to the patient's band, the patient feels they need fluid added to their band. Has gained weight since fluid removal in .  Is interested in AOM.      AOM Considerations:  Phentermine:  Candidate  Topiramate:  Candidate  GLP-1:    Candidate  Naltrexone:  Candidate  Wellbutrin:  Candidate  Metformin:  Not diabetic  Contrave:   No AOM Coverage  Qsymia: No AOM Coverage          WEIGHT SELF ASSESSMENT:      2023     9:07 AM   --   Please check the boxes (1-3 boxes) which you think have been influencing your weight in the past month. The band is not in the right setting       CURRENT EXERCISE AND ACTIVITY:      2023     9:07 AM   --   Please select the statement that best describes your ability to exercise and be active in the past 4 weeks or since your last clinic visit I should be more active but I just have not gotten around to it     Walks 4-5 times weekly. Plays pickle ball twice weekly. Rides bike on weekends. Will be starting strength training soon    BAND ROS:      2023     9:07 AM   --   I am hungry between meals? Yes   I am eating between meals? Yes   I am eating > 1 cup of food at meals? Yes   I am not losing 1-2 lbs a week? Yes   I do not feel a sense of restriction? No           2023     9:07 AM   --   I have pain when swallowing foods or liquids. No   I have heart burn, vomiting, or reflux. No   I  have night cough or hiccups. No   I am making poor food choices (smoothies, shakes, chips). No   I am unable to eat chicken, steak, and bread. No           9/28/2023     9:07 AM   --   Are you pregnant? No   Will you be traveling to remote areas? No   Will you be having major surgery soon? No     ROS:    HEENT  H/O glaucoma:  no  Cardiovascular  CAD:   no  Palpitations:   no  HTN:    no  Gastrointestinal  GERD:   no  Constipation:   no  Gastroparesis:  no  Liver Dz:   no  H/O Pancreatitis:  Yes, when has had gallbladder issue, gallbladder removed  H/O Gallbladder Dz: Yes, removed  Psychiatric  Moods Stable:  yes  Anxiety:   no  Depression:  no  Bipolar:  no  H/O ETOH/Drug Use no  H/O eating disorder: no  Endocrine  PMH/FMH of MTC or MEN2:  no  Neurologic:  H/O seizures:   no  Headaches:  no  Memory Impairment:  no    H/O kidney stones:  No,   Kidney disease:  No, has only 1 kidney  Current birth control:  Yes      BP Readings from Last 6 Encounters:   10/05/23 132/86   09/12/23 132/89   06/27/23 139/88   06/05/23 135/88   12/01/22 136/82   11/21/22 138/88       Pulse Readings from Last 6 Encounters:   10/05/23 88   09/12/23 90   06/27/23 88   06/05/23 98   12/01/22 80   11/21/22 88      Assessment & Plan   Problem List Items Addressed This Visit       Bariatric surgery status    Relevant Orders    Lap Band Adjustment - Clinic (Completed)     Other Visit Diagnoses       Class 2 obesity due to excess calories without serious comorbidity with body mass index (BMI) of 36.0 to 36.9 in adult    -  Primary    Relevant Medications    phentermine (ADIPEX-P) 37.5 MG tablet    Semaglutide-Weight Management (WEGOVY) 0.25 MG/0.5ML pen    Semaglutide-Weight Management (WEGOVY) 0.5 MG/0.5ML pen    Semaglutide-Weight Management (WEGOVY) 1 MG/0.5ML pen    Fitting and adjustment of gastric lap band        Relevant Orders    Lap Band Adjustment - Clinic (Completed)             PATIENT INSTRUCTIONS:  Labs ordered.  Please call  "[phone] to set up a lab appt.     Start Phentermine  Take 1/2 tablet in the am.  After 7-10 days if tolerating but hunger not controlled then can increase to 1 tablet.   Please get blood pressure/pulse checked in 2 weeks.  Send result through [employer].     I ordered Wegovy to your pharmacy.  You may need to be persistent and patient to get these initial dosages due to the shortage.  Once you are able to obtain the 0.25 mg,0.5 mg , and 1 mg dose \"12 weeks of therapy\" contact clinic via [employer] you can begin treatment.      FOLLOW-UP:    Please call [phone] to schedule your next visit in 3 months for a med check/ band assessment      PLAN: After evaluation, we have elected to adjust her gastric band. Risk, benefits, and alternatives were reviewed before a consent was signed. Tisha wishes to proceed and will follow up in 4 weeks  for subsequent assessment.    PROCEDURE: Adjustment of gastric band     PROCEDURE DETAILS: In the clinic exam room, the patient was placed in supine position on the exam table. The area over the access port was prepped with an alcohol swab, gloves were donned, and a Dyson needle and syringe were directed into the port under palpation guidance. A small amount of saline was aspirated to verify location, and 0.4 was delivered into the port. Access needle was withdrawn and bandaid was applied. Patient sat up and drank 4 ounces of lukewarm water without difficulty. She should return to a liquid diet and advance as tolerated. Tight band warning signs were reviewed.  She left home in a stable and ambulatory condition.       31 minutes spent on the date of the encounter doing chart review, history and exam, review test results, counseling, developing plan of care, documentation, and further activities as noted above.        "

## 2023-10-05 ENCOUNTER — OFFICE VISIT (OUTPATIENT)
Dept: SURGERY | Facility: CLINIC | Age: 38
End: 2023-10-05
Payer: COMMERCIAL

## 2023-10-05 VITALS
OXYGEN SATURATION: 97 % | DIASTOLIC BLOOD PRESSURE: 89 MMHG | SYSTOLIC BLOOD PRESSURE: 132 MMHG | BODY MASS INDEX: 37.22 KG/M2 | WEIGHT: 260 LBS | HEIGHT: 70 IN | HEART RATE: 90 BPM

## 2023-10-05 VITALS
WEIGHT: 256 LBS | OXYGEN SATURATION: 97 % | HEIGHT: 70 IN | DIASTOLIC BLOOD PRESSURE: 86 MMHG | HEART RATE: 88 BPM | SYSTOLIC BLOOD PRESSURE: 132 MMHG | BODY MASS INDEX: 36.65 KG/M2

## 2023-10-05 DIAGNOSIS — E66.09 CLASS 2 OBESITY DUE TO EXCESS CALORIES WITHOUT SERIOUS COMORBIDITY WITH BODY MASS INDEX (BMI) OF 36.0 TO 36.9 IN ADULT: Primary | ICD-10-CM

## 2023-10-05 DIAGNOSIS — Z98.84 BARIATRIC SURGERY STATUS: ICD-10-CM

## 2023-10-05 DIAGNOSIS — Z46.51 FITTING AND ADJUSTMENT OF GASTRIC LAP BAND: ICD-10-CM

## 2023-10-05 DIAGNOSIS — K91.2 MALNUTRITION FOLLOWING GASTROINTESTINAL SURGERY: ICD-10-CM

## 2023-10-05 DIAGNOSIS — E66.3 OVERWEIGHT WITH BODY MASS INDEX (BMI) OF 29 TO 29.9 IN ADULT: ICD-10-CM

## 2023-10-05 DIAGNOSIS — E66.812 CLASS 2 OBESITY DUE TO EXCESS CALORIES WITHOUT SERIOUS COMORBIDITY WITH BODY MASS INDEX (BMI) OF 36.0 TO 36.9 IN ADULT: Primary | ICD-10-CM

## 2023-10-05 PROCEDURE — 99213 OFFICE O/P EST LOW 20 MIN: CPT | Performed by: PHYSICIAN ASSISTANT

## 2023-10-05 PROCEDURE — S2083 ADJUSTMENT GASTRIC BAND: HCPCS | Performed by: PHYSICIAN ASSISTANT

## 2023-10-05 RX ORDER — PHENTERMINE HYDROCHLORIDE 37.5 MG/1
TABLET ORAL
Qty: 90 TABLET | Refills: 1 | Status: SHIPPED | OUTPATIENT
Start: 2023-10-05 | End: 2024-05-09

## 2023-10-05 RX ORDER — SEMAGLUTIDE 0.25 MG/.5ML
0.25 INJECTION, SOLUTION SUBCUTANEOUS WEEKLY
Qty: 2 ML | Refills: 0 | Status: SHIPPED | OUTPATIENT
Start: 2023-10-05 | End: 2023-10-20

## 2023-10-05 NOTE — PATIENT INSTRUCTIONS
"To ensure quality you may receive a patient satisfaction survey. The greatest compliment you can give is \"Likely to Recommend.\"    Nice to talk with you today.  Thank you for your trust. Below is the plan discussed.-  DANIEL Rivero      Plan:  Labs ordered.  Please call [phone] to set up a lab appt.     Start Phentermine  Take 1/2 tablet in the am.  After 7-10 days if tolerating but hunger not controlled then can increase to 1 tablet.   Please get blood pressure/pulse checked in 2 weeks.  Send result through [email or web address].     I ordered Wegovy to your pharmacy.  You may need to be persistent and patient to get these initial dosages due to the shortage.  Once you are able to obtain the 0.25 mg,0.5 mg , and 1 mg dose \"12 weeks of therapy\" contact clinic via [employer] you can begin treatment.      FOLLOW-UP:    Please call [phone] to schedule your next visit in 3 months for a med check/ band assessment    PHENTERMINE    We are starting Phentermine.  Our patients on Phentermine find that they:    >feel less hunger    >find it easier to push the plate away   >have an easier time eating less    For some of our patients, these feelings are very real and immediate. For other patients, the feelings are less obvious. They don't feel much of a change but find they've lost weight. Like all weight loss medications, Phentermine  works best when you help it work. This means:  1. Having less tempting high calorie (fattening) food around the house or office. (For people with strong cravings this is very important.)   2. Staying away from situations or people that may trigger your cravings .   3. Eating out only one time or less each week.  4. Eating your meals at a table with the TV or computer off.    Side-effects. Phentermine is generally well tolerated. The most common side effects are dry mouth and constipation. Because it is a stimulant, pts can have feelings of racing pulse or rapid heart beat. Some people can get an " elevated blood pressure. Because of this we ask you to get your blood pressure and pulse checked within 1-2 weeks of starting the medication.     For any questions or concerns please send a [employer] message to our team or call our weight management call center at [phone] during regular business hours. For questions during evenings or weekends your messages will be addressed during the next business day.  For emergencies please call 911 or seek immediate medical care.

## 2023-10-05 NOTE — ASSESSMENT & PLAN NOTE
We discussed healthy habits to assist with weight loss. We discussed medication that may assist with weight loss. Phentermine was prescribed. Risks/ benefits and possible side effects were discussed and questions were answered. Written information was given.

## 2023-10-20 ENCOUNTER — MYC MEDICAL ADVICE (OUTPATIENT)
Dept: SURGERY | Facility: CLINIC | Age: 38
End: 2023-10-20
Payer: COMMERCIAL

## 2023-10-20 DIAGNOSIS — E66.812 CLASS 2 OBESITY DUE TO EXCESS CALORIES WITHOUT SERIOUS COMORBIDITY WITH BODY MASS INDEX (BMI) OF 36.0 TO 36.9 IN ADULT: ICD-10-CM

## 2023-10-20 DIAGNOSIS — E66.09 CLASS 2 OBESITY DUE TO EXCESS CALORIES WITHOUT SERIOUS COMORBIDITY WITH BODY MASS INDEX (BMI) OF 36.0 TO 36.9 IN ADULT: ICD-10-CM

## 2023-10-20 RX ORDER — SEMAGLUTIDE 0.25 MG/.5ML
0.25 INJECTION, SOLUTION SUBCUTANEOUS WEEKLY
Qty: 2 ML | Refills: 0 | Status: SHIPPED | OUTPATIENT
Start: 2023-10-20 | End: 2024-01-19

## 2023-11-07 ENCOUNTER — TELEPHONE (OUTPATIENT)
Dept: NEUROLOGY | Facility: CLINIC | Age: 38
End: 2023-11-07

## 2023-11-09 NOTE — TELEPHONE ENCOUNTER
Prior Authorization Approval    Medication: UBRELVY 50 MG PO TABS  Authorization Effective Date: 10/10/2023  Authorization Expiration Date: 11/8/2024  Approved Dose/Quantity:   Reference #:     Insurance Company: [employer]ty [employer]'s - Phone [phone] Fax [phone]  Expected CoPay: $    CoPay Card Available:      Financial Assistance Needed:   Which [facility] is filling the prescription[id number] [place] [facility] [place] [place] [facility] [address] AT [facility] [address] [facility]  Pharmacy Notified: [facility] Only  Patient Notified: No

## 2023-11-20 ENCOUNTER — LAB (OUTPATIENT)
Dept: LAB | Facility: CLINIC | Age: 38
End: 2023-11-20
Payer: COMMERCIAL

## 2023-11-20 DIAGNOSIS — Z98.84 BARIATRIC SURGERY STATUS: ICD-10-CM

## 2023-11-20 DIAGNOSIS — K91.2 POSTSURGICAL MALABSORPTION: ICD-10-CM

## 2023-11-20 LAB
ERYTHROCYTE [DISTWIDTH] IN BLOOD BY AUTOMATED COUNT: 12.7 % (ref 10–15)
HCT VFR BLD AUTO: 42.7 % (ref 35–47)
HGB BLD-MCNC: 14 G/DL (ref 11.7–15.7)
MCH RBC QN AUTO: 28.7 PG (ref 26.5–33)
MCHC RBC AUTO-ENTMCNC: 32.8 G/DL (ref 31.5–36.5)
MCV RBC AUTO: 88 FL (ref 78–100)
PLATELET # BLD AUTO: 243 10E3/UL (ref 150–450)
PTH-INTACT SERPL-MCNC: 26 PG/ML (ref 15–65)
RBC # BLD AUTO: 4.87 10E6/UL (ref 3.8–5.2)
VIT B12 SERPL-MCNC: 284 PG/ML (ref 232–1245)
VIT D+METAB SERPL-MCNC: 49 NG/ML (ref 20–50)
WBC # BLD AUTO: 6.4 10E3/UL (ref 4–11)

## 2023-11-20 PROCEDURE — 36415 COLL VENOUS BLD VENIPUNCTURE: CPT

## 2023-11-20 PROCEDURE — 83970 ASSAY OF PARATHORMONE: CPT

## 2023-11-20 PROCEDURE — 85027 COMPLETE CBC AUTOMATED: CPT

## 2023-11-20 PROCEDURE — 82306 VITAMIN D 25 HYDROXY: CPT

## 2023-11-20 PROCEDURE — 82607 VITAMIN B-12: CPT

## 2024-01-10 NOTE — PROGRESS NOTES
Band Assessment Visit    1/15/2024   Name: Tisha Almeida  MR#: [id number]  : 1985    VITALS:          Weight: 251 lbs 14.4 oz         BMI: Body mass index is 36.67 kg/m .         Wt change since last visit (lbs): -4.1         Cumulative weight loss (lbs): -23.6    SUBJECTIVE:  Patient comes to the clinic today for band assessment.  Last seen 10/5/2023 for band assessment and 0.5 ml was added to her band.  We also started her on Phentermine.   Mom just diagnosed with ALL.  Just had second round of chemo.      Sometimes hunger is controlled.  Otherwise it is not always the case.   Thinks band is in the right setting.      AOM Considerations:  Phentermine:  Candidate  Topiramate:  Candidate  GLP-1:    Candidate  Naltrexone:  Candidate  Wellbutrin:  Candidate  Metformin:  Not diabetic  Contrave:   No AOM Coverage  Qsymia: No AOM Coverage        Wt Readings from Last 10 Encounters:   01/15/24 251 lb 14.4 oz (114.3 kg)   10/05/23 256 lb (116.1 kg)   23 260 lb (117.9 kg)   23 250 lb (113.4 kg)   23 248 lb (112.5 kg)   23 247 lb (112 kg)   22 242 lb (109.8 kg)   22 248 lb 6.4 oz (112.7 kg)   07/10/19 237 lb (107.5 kg)   17 234 lb 6.4 oz (106.3 kg)      WEIGHT SELF ASSESSMENT:      2024     8:39 AM   --   Please check the boxes (1-3 boxes) which you think have been influencing your weight in the past month. I have not been active or exercising       CURRENT EXERCISE AND ACTIVITY:      2024     8:39 AM   --   Please select the statement that best describes your ability to exercise and be active in the past 4 weeks or since your last clinic visit I should be more active but I just have not gotten around to it     Walks 4-5 times weekly. Plays pickle ball twice weekly. Rides bike on weekends. Will be starting strength training soon    BAND ROS:      2024     8:39 AM   --   I am hungry between meals? Yes   I am eating between meals? Yes   I am eating > 1 cup of  food at meals? Yes   I am not losing 1-2 lbs a week? Yes   I do not feel a sense of restriction? Yes           1/8/2024     8:39 AM   --   I have pain when swallowing foods or liquids. No   I have heart burn, vomiting, or reflux. No   I have night cough or hiccups. No   I am making poor food choices (smoothies, shakes, chips). No   I am unable to eat chicken, steak, and bread. No           1/8/2024     8:39 AM   --   Are you pregnant? No- on Nuvaring   Will you be traveling to remote areas? No   Will you be having major surgery soon? No       H/O glaucoma:  no  Cardiovascular  CAD:   no  Palpitations:   no  HTN:    no  Gastrointestinal  GERD:   no  Constipation:   no  Gastroparesis:  no  Liver Dz:   no  H/O Pancreatitis:  Yes, when has had gallbladder issue, gallbladder removed  H/O Gallbladder Dz: Yes, removed  Psychiatric  Moods Stable:  yes  Anxiety:   no  Depression:  no  Bipolar:  no  H/O ETOH/Drug Use no  H/O eating disorder: no  Endocrine  PMH/FMH of MTC or MEN2:  no  Neurologic:  H/O seizures:   no  Headaches:  no  Memory Impairment:  no    H/O kidney stones:  No,   Kidney disease:  No, has only 1 kidney  Current birth control:  Yes      BP Readings from Last 6 Encounters:   01/15/24 128/85   10/05/23 132/86   09/12/23 132/89   06/27/23 139/88   06/05/23 135/88   12/01/22 136/82       Pulse Readings from Last 6 Encounters:   01/15/24 96   10/05/23 88   09/12/23 90   06/27/23 88   06/05/23 98   12/01/22 80      Assessment & Plan   Problem List Items Addressed This Visit       Bariatric surgery status     Lap band Dr. Figueroa on 5/26/2011          Single kidney     Will check BMP before starting Topiramate         Relevant Orders    Basic metabolic panel    Class 2 obesity due to excess calories without serious comorbidity with body mass index (BMI) of 36.0 to 36.9 in adult - Primary     Patient was congratulated on wt loss success thus far. Healthy habits to assist with further weight loss were discussed.  Tisha will continue phentermine and add Topiramate if BMP WNL.  No adjustment was performed today           Relevant Orders    Basic metabolic panel    Malnutrition following gastrointestinal surgery     No vitamin labs needed at this time               PATIENT INSTRUCTIONS:  Continue phentermine 37.5 mg daily  Start Topiramate (Remember to drink plenty of fluids daily)   Labs ordered.  Please call [phone] to set up a lab appt.     Goals:  Take care of yourself    FOLLOW-UP:    Please call [phone] to schedule your next visit in 3 mo

## 2024-01-15 ENCOUNTER — OFFICE VISIT (OUTPATIENT)
Dept: SURGERY | Facility: CLINIC | Age: 39
End: 2024-01-15
Payer: COMMERCIAL

## 2024-01-15 VITALS
HEART RATE: 96 BPM | SYSTOLIC BLOOD PRESSURE: 128 MMHG | DIASTOLIC BLOOD PRESSURE: 85 MMHG | WEIGHT: 251.9 LBS | HEIGHT: 70 IN | BODY MASS INDEX: 36.06 KG/M2 | OXYGEN SATURATION: 98 %

## 2024-01-15 DIAGNOSIS — Z90.5 SINGLE KIDNEY: ICD-10-CM

## 2024-01-15 DIAGNOSIS — Z98.84 BARIATRIC SURGERY STATUS: ICD-10-CM

## 2024-01-15 DIAGNOSIS — E66.09 CLASS 2 OBESITY DUE TO EXCESS CALORIES WITHOUT SERIOUS COMORBIDITY WITH BODY MASS INDEX (BMI) OF 36.0 TO 36.9 IN ADULT: Primary | ICD-10-CM

## 2024-01-15 DIAGNOSIS — K91.2 MALNUTRITION FOLLOWING GASTROINTESTINAL SURGERY: ICD-10-CM

## 2024-01-15 DIAGNOSIS — E66.812 CLASS 2 OBESITY DUE TO EXCESS CALORIES WITHOUT SERIOUS COMORBIDITY WITH BODY MASS INDEX (BMI) OF 36.0 TO 36.9 IN ADULT: Primary | ICD-10-CM

## 2024-01-15 PROCEDURE — 99214 OFFICE O/P EST MOD 30 MIN: CPT | Performed by: PHYSICIAN ASSISTANT

## 2024-01-15 NOTE — PATIENT INSTRUCTIONS
"To ensure quality you may receive a patient satisfaction survey. The greatest compliment you can give is \"Likely to Recommend.\"    Nice to talk with you today.  Thank you for your trust. Below is the plan discussed.-  DANIEL Rivero      Plan:  Continue phentermine 37.5 mg daily    Start Topiramate (Remember to drink plenty of fluids daily)   Week 1:Take 1 tablet (25 mg) at bedtime  Week 2 Take 2 tablets (50 mg) at bedtime  Week 3:Take 3 tablets (75 mg) at bedtime Stay at 3 tabs until you are seen again.     Labs ordered.  Please call [phone] to set up a lab appt.     Goals:  Take care of yourself    FOLLOW-UP:    Please call [phone] to schedule your next visit in 3 mo    TOPIRAMATE (TOPAMAX)    Topiramate (Topamax) is a medication that is used most often to treat migraine headaches or for seizures. It has also been found to help with weight loss. Although it's not currently FDA approved for weight loss, it has been used safely for a number of years to help people who are carrying extra weight.     Just how topiramate helps with weight loss has not been exactly determined. However it seems to work on areas of the brain to quiet down signals related to eating.      Topiramate may make you:    >feel less interest in eating in between meals   >think less about food and eating   >find it easier to push the plate away   >find giving up pop easier    >have an easier time eating less    Instructions:  Week 1:Take 1 tablet (25 mg) at bedtime  Week 2 Take 2 tablets (50 mg) at bedtime  Week 3:Take 3 tablets (75 mg) at bedtime Stay at 3 tabs until you are seen again.     For some of our patients, the pills work right away. They feel and think quite differently about food. Other patients don't feel much of a change but find in fact they have lost weight! Like all weight loss medications, topiramate works best when you help it work.  This means:   1) Have less tempting high calorie (fattening) food around the house or " office    2) Have lower calorie food (fruits, vegetables,low fat meats and dairy) for snacks    3) Eat out only one time or less each week.   4) Eat your meals at a table with the TV or computer off.    Side-effects Topiramate is generally well tolerated. The main side-effects we see are:   Tingling in hands,feet, or face (usually not very troublesome)   Mental confusion and word finding trouble (about 10% of patients have this.)     Feeling sleepy or a bit dopey- this goes away very soon after starting.    One of the dangers of topiramate is the possibility of birth defects--if you get pregnant when you are on it, there is the risk that your baby will be born with a cleft lip or palate.  If you are on topiramate and of child bearing age, you need to be on a reliable form of birth control or refrain from sexual intercourse.     For any questions or concerns please send a [employer] message to our team or call our weight management call center at [phone] during regular business hours. For questions during evenings or weekends your messages will be addressed during the next business day.  For emergencies please call 911 or seek immediate medical care.

## 2024-01-15 NOTE — ASSESSMENT & PLAN NOTE
Patient was congratulated on wt loss success thus far. Healthy habits to assist with further weight loss were discussed. Tisha will continue phentermine and add Topiramate if BMP WNL.  No adjustment was performed today

## 2024-01-17 ENCOUNTER — LAB (OUTPATIENT)
Dept: LAB | Facility: CLINIC | Age: 39
End: 2024-01-17
Payer: COMMERCIAL

## 2024-01-17 DIAGNOSIS — E66.812 CLASS 2 OBESITY DUE TO EXCESS CALORIES WITHOUT SERIOUS COMORBIDITY WITH BODY MASS INDEX (BMI) OF 36.0 TO 36.9 IN ADULT: ICD-10-CM

## 2024-01-17 DIAGNOSIS — E66.09 CLASS 2 OBESITY DUE TO EXCESS CALORIES WITHOUT SERIOUS COMORBIDITY WITH BODY MASS INDEX (BMI) OF 36.0 TO 36.9 IN ADULT: ICD-10-CM

## 2024-01-17 DIAGNOSIS — Z90.5 SINGLE KIDNEY: ICD-10-CM

## 2024-01-17 PROCEDURE — 80048 BASIC METABOLIC PNL TOTAL CA: CPT

## 2024-01-17 PROCEDURE — 36415 COLL VENOUS BLD VENIPUNCTURE: CPT

## 2024-01-18 LAB
ANION GAP SERPL CALCULATED.3IONS-SCNC: 10 MMOL/L (ref 7–15)
BUN SERPL-MCNC: 9.8 MG/DL (ref 6–20)
CALCIUM SERPL-MCNC: 9.7 MG/DL (ref 8.6–10)
CHLORIDE SERPL-SCNC: 107 MMOL/L (ref 98–107)
CREAT SERPL-MCNC: 0.95 MG/DL (ref 0.51–0.95)
DEPRECATED HCO3 PLAS-SCNC: 24 MMOL/L (ref 22–29)
EGFRCR SERPLBLD CKD-EPI 2021: 77 ML/MIN/1.73M2
GLUCOSE SERPL-MCNC: 95 MG/DL (ref 70–99)
POTASSIUM SERPL-SCNC: 4 MMOL/L (ref 3.4–5.3)
SODIUM SERPL-SCNC: 141 MMOL/L (ref 135–145)

## 2024-02-24 ENCOUNTER — APPOINTMENT (OUTPATIENT)
Dept: GENERAL RADIOLOGY | Facility: CLINIC | Age: 39
End: 2024-02-24
Attending: EMERGENCY MEDICINE
Payer: COMMERCIAL

## 2024-02-24 ENCOUNTER — HOSPITAL ENCOUNTER (EMERGENCY)
Facility: CLINIC | Age: 39
Discharge: HOME | End: 2024-02-24
Attending: EMERGENCY MEDICINE | Admitting: EMERGENCY MEDICINE
Payer: COMMERCIAL

## 2024-02-24 VITALS
WEIGHT: 255 LBS | HEART RATE: 92 BPM | SYSTOLIC BLOOD PRESSURE: 145 MMHG | HEIGHT: 70 IN | TEMPERATURE: 98 F | BODY MASS INDEX: 36.51 KG/M2 | OXYGEN SATURATION: 98 % | DIASTOLIC BLOOD PRESSURE: 78 MMHG | RESPIRATION RATE: 17 BRPM

## 2024-02-24 DIAGNOSIS — R07.9 CHEST PAIN, UNSPECIFIED TYPE: ICD-10-CM

## 2024-02-24 LAB
ALBUMIN SERPL BCG-MCNC: 3.8 G/DL (ref 3.5–5.2)
ALP SERPL-CCNC: 72 U/L (ref 40–150)
ALT SERPL W P-5'-P-CCNC: 13 U/L (ref 0–50)
ANION GAP SERPL CALCULATED.3IONS-SCNC: 11 MMOL/L (ref 7–15)
AST SERPL W P-5'-P-CCNC: 14 U/L (ref 0–45)
ATRIAL RATE - MUSE: 97 BPM
BASOPHILS # BLD AUTO: 0 10E3/UL (ref 0–0.2)
BASOPHILS NFR BLD AUTO: 1 %
BILIRUB SERPL-MCNC: 0.2 MG/DL
BUN SERPL-MCNC: 7.7 MG/DL (ref 6–20)
CALCIUM SERPL-MCNC: 8.9 MG/DL (ref 8.6–10)
CHLORIDE SERPL-SCNC: 110 MMOL/L (ref 98–107)
CREAT SERPL-MCNC: 0.92 MG/DL (ref 0.51–0.95)
D DIMER PPP FEU-MCNC: 0.49 UG/ML FEU (ref 0–0.5)
DEPRECATED HCO3 PLAS-SCNC: 18 MMOL/L (ref 22–29)
DIASTOLIC BLOOD PRESSURE - MUSE: NORMAL MMHG
EGFRCR SERPLBLD CKD-EPI 2021: 80 ML/MIN/1.73M2
EOSINOPHIL # BLD AUTO: 0.1 10E3/UL (ref 0–0.7)
EOSINOPHIL NFR BLD AUTO: 1 %
ERYTHROCYTE [DISTWIDTH] IN BLOOD BY AUTOMATED COUNT: 13.2 % (ref 10–15)
GLUCOSE SERPL-MCNC: 111 MG/DL (ref 70–99)
HCT VFR BLD AUTO: 40.4 % (ref 35–47)
HGB BLD-MCNC: 13.5 G/DL (ref 11.7–15.7)
HOLD SPECIMEN: NORMAL
IMM GRANULOCYTES # BLD: 0 10E3/UL
IMM GRANULOCYTES NFR BLD: 0 %
INTERPRETATION ECG - MUSE: NORMAL
LYMPHOCYTES # BLD AUTO: 2.2 10E3/UL (ref 0.8–5.3)
LYMPHOCYTES NFR BLD AUTO: 34 %
MCH RBC QN AUTO: 29.2 PG (ref 26.5–33)
MCHC RBC AUTO-ENTMCNC: 33.4 G/DL (ref 31.5–36.5)
MCV RBC AUTO: 87 FL (ref 78–100)
MONOCYTES # BLD AUTO: 0.3 10E3/UL (ref 0–1.3)
MONOCYTES NFR BLD AUTO: 5 %
NEUTROPHILS # BLD AUTO: 3.8 10E3/UL (ref 1.6–8.3)
NEUTROPHILS NFR BLD AUTO: 59 %
NRBC # BLD AUTO: 0 10E3/UL
NRBC BLD AUTO-RTO: 0 /100
NT-PROBNP SERPL-MCNC: <36 PG/ML (ref 0–450)
P AXIS - MUSE: 40 DEGREES
PLATELET # BLD AUTO: 258 10E3/UL (ref 150–450)
POTASSIUM SERPL-SCNC: 3.9 MMOL/L (ref 3.4–5.3)
PR INTERVAL - MUSE: 160 MS
PROT SERPL-MCNC: 6.6 G/DL (ref 6.4–8.3)
QRS DURATION - MUSE: 86 MS
QT - MUSE: 338 MS
QTC - MUSE: 429 MS
R AXIS - MUSE: 13 DEGREES
RBC # BLD AUTO: 4.63 10E6/UL (ref 3.8–5.2)
SODIUM SERPL-SCNC: 139 MMOL/L (ref 135–145)
SYSTOLIC BLOOD PRESSURE - MUSE: NORMAL MMHG
T AXIS - MUSE: 41 DEGREES
TROPONIN T SERPL HS-MCNC: <6 NG/L
VENTRICULAR RATE- MUSE: 97 BPM
WBC # BLD AUTO: 6.4 10E3/UL (ref 4–11)

## 2024-02-24 PROCEDURE — 36415 COLL VENOUS BLD VENIPUNCTURE: CPT | Performed by: EMERGENCY MEDICINE

## 2024-02-24 PROCEDURE — 93005 ELECTROCARDIOGRAM TRACING: CPT

## 2024-02-24 PROCEDURE — 71046 X-RAY EXAM CHEST 2 VIEWS: CPT

## 2024-02-24 PROCEDURE — 83880 ASSAY OF NATRIURETIC PEPTIDE: CPT | Performed by: EMERGENCY MEDICINE

## 2024-02-24 PROCEDURE — 82040 ASSAY OF SERUM ALBUMIN: CPT | Performed by: EMERGENCY MEDICINE

## 2024-02-24 PROCEDURE — 85379 FIBRIN DEGRADATION QUANT: CPT | Performed by: EMERGENCY MEDICINE

## 2024-02-24 PROCEDURE — 85025 COMPLETE CBC W/AUTO DIFF WBC: CPT | Performed by: EMERGENCY MEDICINE

## 2024-02-24 PROCEDURE — 99285 EMERGENCY DEPT VISIT HI MDM: CPT | Mod: 25

## 2024-02-24 PROCEDURE — 84484 ASSAY OF TROPONIN QUANT: CPT | Performed by: EMERGENCY MEDICINE

## 2024-02-24 ASSESSMENT — ACTIVITIES OF DAILY LIVING (ADL)
ADLS_ACUITY_SCORE: 35
ADLS_ACUITY_SCORE: 35

## 2024-02-24 NOTE — ED TRIAGE NOTES
Pt states that she has had chest pain for the last 3 days; states that nothing seems to make it worse.  Denies any other symptoms.

## 2024-02-24 NOTE — ED PROVIDER NOTES
"  History     Chief Complaint:  Chest Pain       The history is provided by the patient.      Tisha Almeida is a 38 year old female presenting to the ED for evaluation of chest pain. The patient reports that she has been experiencing constant chest pain for the past few days, along with some associated shortness of breath upon exertion. She describes it as a warmth and pressure in her chest. She denies any other symptoms including pain or swelling in her legs, abdominal pain, dizziness, nausea, or fever. The patient says the pain does not get worse with movement, inhalation, or eating. She also states that his is not similar to her past heartburn. She denies smoking, recent travel, recent surgery, or a personal cardiac history. Her father denies a family history of blood clots but states that his father had a myocardial infarction.    Independent Historian:   None - Patient Only    Review of External Notes:   None    Medications:    Melatonin  Adipex-P  Compazine  Topamax  Ubrelvy    Past Medical History:    Single kidney  Wilm's tumor  Malnutrition following gastrointestinal surgery    Past Surgical History:    Cholecystectomy  Nephrectomy  Tonsillectomy  Adenoidectomy      Physical Exam   Patient Vitals for the past 24 hrs:   BP Temp Temp src Pulse Resp SpO2 Height Weight   02/24/24 1130 (!) 145/78 -- -- 92 17 98 % -- --   02/24/24 1100 135/76 -- -- 92 13 97 % -- --   02/24/24 1030 130/85 -- -- 93 16 96 % -- --   02/24/24 1015 136/83 -- -- 93 24 97 % -- --   02/24/24 1000 -- -- -- 94 15 97 % -- --   02/24/24 0945 137/88 -- -- -- -- -- -- --   02/24/24 0925 (!) 166/73 98  F (36.7  C) Temporal 102 16 98 % 1.778 m (5' 10\") 115.7 kg (255 lb)        Physical Exam  Nursing note and vitals reviewed.  Constitutional:  Oriented to person, place, and time. Cooperative.   HENT:   Nose:    Nose normal.   Mouth/Throat:   Mucous membranes are normal.   Eyes:    Conjunctivae normal and EOM are normal.      Pupils are equal, " round, and reactive to light.   Neck:    Trachea normal.   Cardiovascular:  Normal rate, regular rhythm, normal heart sounds and normal pulses. No murmur heard.  Pulmonary/Chest:  Effort normal and breath sounds normal.   Abdominal:   Soft. Normal appearance and bowel sounds are normal.      There is no tenderness.      There is no rebound and no CVA tenderness.   Musculoskeletal:  Extremities atraumatic x 4.   Lymphadenopathy:  No cervical adenopathy.   Neurological:   Alert and oriented to person, place, and time. Normal strength.      No cranial nerve deficit or sensory deficit. GCS eye subscore is 4. GCS verbal subscore is 5. GCS motor subscore is 6.   Skin:    Skin is intact. No rash noted.   Psychiatric:   Normal mood and affect.      Emergency Department Course   ECG  ECG taken at 0915, ECG read at 0930  Normal sinus rhythm with sinus arrhythmia   Rate 97 bpm. NM interval 160 ms. QRS duration 86 ms. QT/QTc 338/429 ms. P-R-T axes 40 13 41.     Imaging:  XR Chest 2 Views   Final Result   IMPRESSION: Negative chest.      Report per radiology.    Laboratory:  Labs Ordered and Resulted from Time of ED Arrival to Time of ED Departure   COMPREHENSIVE METABOLIC PANEL - Abnormal       Result Value    Sodium 139      Potassium 3.9      Carbon Dioxide (CO2) 18 (*)     Anion Gap 11      Urea Nitrogen 7.7      Creatinine 0.92      GFR Estimate 80      Calcium 8.9      Chloride 110 (*)     Glucose 111 (*)     Alkaline Phosphatase 72      AST 14      ALT 13      Protein Total 6.6      Albumin 3.8      Bilirubin Total 0.2     D DIMER QUANTITATIVE - Normal    D-Dimer Quantitative 0.49     TROPONIN T, HIGH SENSITIVITY - Normal    Troponin T, High Sensitivity <6     NT PROBNP INPATIENT - Normal    N terminal Pro BNP Inpatient <36     CBC WITH PLATELETS AND DIFFERENTIAL    WBC Count 6.4      RBC Count 4.63      Hemoglobin 13.5      Hematocrit 40.4      MCV 87      MCH 29.2      MCHC 33.4      RDW 13.2      Platelet Count 258       % Neutrophils 59      % Lymphocytes 34      % Monocytes 5      % Eosinophils 1      % Basophils 1      % Immature Granulocytes 0      NRBCs per 100 WBC 0      Absolute Neutrophils 3.8      Absolute Lymphocytes 2.2      Absolute Monocytes 0.3      Absolute Eosinophils 0.1      Absolute Basophils 0.0      Absolute Immature Granulocytes 0.0      Absolute NRBCs 0.0          Emergency Department Course & Assessments:    Interventions:  Medications - No data to display     Independent Interpretation (X-rays, CTs, rhythm strip):  I independently interpreted the patient's chest X-ray, and agree with the negative reading for pulmonary edema.     Assessments/Consultations/Discussion of Management or Tests:   ED Course as of 02/24/24 1351   Sat Feb 24, 2024   0934 I obtained history and examined the patient as noted above.     1202 I rechecked the patient and discussed findings.      Social Determinants of Health affecting care:   None    Disposition:  The patient was discharged.     Impression & Plan    CMS Diagnoses: None    Medical Decision Making:  This is a 38-year-old female who came in for further evaluation of chest pain.  She really has no other symptoms other than some mild shortness of breath with exertion.  Her EKG is unremarkable, and her troponin also was negative.  She also had a negative D-dimer, and therefore I feel that pulmonary embolism has been sufficiently ruled out.  She then had a chest x-ray obtained as well, which was also normal.  With the duration of her symptoms and lack of risk factors along with her negative workup, I feel that all potentially serious etiologies have been ruled out.  I feel that she is safe for discharge and outpatient management.  I recommended follow-up with primary care and certainly returning with any concerns or worsening symptoms.    Diagnosis:    ICD-10-CM    1. Chest pain, unspecified type  R07.9         Scribe Disclosure:  I, Melissa Smith, am serving as a scribe at  9:47 AM on 2/24/2024 to document services personally performed by Lobo Ho MD based on my observations and the provider's statements to me.     2/24/2024   Lobo Ho MD Lashkowitz, Seth H, MD  02/24/24 1845

## 2024-02-27 ENCOUNTER — MYC REFILL (OUTPATIENT)
Dept: NEUROLOGY | Facility: CLINIC | Age: 39
End: 2024-02-27
Payer: COMMERCIAL

## 2024-02-27 ENCOUNTER — OFFICE VISIT (OUTPATIENT)
Dept: FAMILY MEDICINE | Facility: CLINIC | Age: 39
End: 2024-02-27

## 2024-02-27 ENCOUNTER — TELEPHONE (OUTPATIENT)
Dept: NEUROLOGY | Facility: CLINIC | Age: 39
End: 2024-02-27
Payer: COMMERCIAL

## 2024-02-27 VITALS
HEART RATE: 103 BPM | BODY MASS INDEX: 36.3 KG/M2 | SYSTOLIC BLOOD PRESSURE: 142 MMHG | WEIGHT: 253 LBS | DIASTOLIC BLOOD PRESSURE: 74 MMHG | RESPIRATION RATE: 98 BRPM

## 2024-02-27 DIAGNOSIS — R07.89 ATYPICAL CHEST PAIN: ICD-10-CM

## 2024-02-27 DIAGNOSIS — Z12.31 ENCOUNTER FOR SCREENING MAMMOGRAM FOR MALIGNANT NEOPLASM OF BREAST: Primary | ICD-10-CM

## 2024-02-27 DIAGNOSIS — G43.109 MIGRAINE WITH AURA AND WITHOUT STATUS MIGRAINOSUS, NOT INTRACTABLE: ICD-10-CM

## 2024-02-27 LAB
CHOLESTEROL: 181 MG/DL (ref 100–199)
FASTING?: NO
HDL (RMG): 37 MG/DL (ref 40–?)
LDL CALCULATED (RMG): 64 MG/DL (ref 0–130)
TRIGLYCERIDES (RMG): 396 MG/DL (ref 0–149)

## 2024-02-27 PROCEDURE — 99214 OFFICE O/P EST MOD 30 MIN: CPT | Performed by: FAMILY MEDICINE

## 2024-02-27 PROCEDURE — 80061 LIPID PANEL: CPT | Mod: QW | Performed by: FAMILY MEDICINE

## 2024-02-27 PROCEDURE — 36415 COLL VENOUS BLD VENIPUNCTURE: CPT | Performed by: FAMILY MEDICINE

## 2024-02-27 NOTE — TELEPHONE ENCOUNTER
Prior Authorization Retail Medication Request     Medication/Dose: Ubrelvy 50 mg  ICD code (if different than what is on RX):  G43.109  Previously Tried and Failed: Would avoid estrogen or estrogen containing products  Topirimate   Tylenol, Exedrin, Naproxen, Ibuprofen  Triptans - contraindicated   Rationale:  question hemiplegic migraine.      Insurance Name: [facility]  Insurance ID:  [id number]     Pharmacy Information (if different than what is on RX)  Name:   Phone:

## 2024-02-27 NOTE — PROGRESS NOTES
"Answers submitted by the patient for this visit:  General Questionnaire (Submitted on 2/26/2024)  Chief Complaint: Chronic problems general questions HPI Form  How many servings of fruits and vegetables do you eat daily?: 2-3  On average, how many sweetened beverages do you drink each day (Examples: soda, juice, sweet tea, etc.  Do NOT count diet or artificially sweetened beverages)?: 0  How many minutes a day do you exercise enough to make your heart beat faster?: 30 to 60  How many days a week do you exercise enough to make your heart beat faster?: 5  How many days per week do you miss taking your medication?: 1  What makes it hard for you to take your medication every day?: remembering to take  General Concern (Submitted on 2/26/2024)  Chief Complaint: Chronic problems general questions HPI Form  What is the reason for your visit today?: Follow up from ER visit: chear pains  When did your symptoms begin?: 3-7 days ago  What are your symptoms?: Cheat pains; lots of pressur  How would you describe these symptoms?: Moderate  Are your symptoms:: Staying the same  Have you had these symptoms before?: No  Is there anything that makes you feel worse?: No.  Is there anything that makes you feel better?: No.      Assessment & Plan     Encounter for screening mammogram for malignant neoplasm of breast    - MA Screening Digital Bilateral    Atypical chest pain  Lets check lipids and get a stress echo.  Risk's include weight and stress,  No family history  - Lipid Profile ([facility])  - Exercise Stress Echocardiogram      MED REC REQUIRED  Post Medication Reconciliation Status: discharge medications reconciled, continue medications without change  BMI  Estimated body mass index is 36.3 kg/m  as calculated from the following:    Height as of 2/24/24: 1.778 m (5' 10\").    Weight as of this encounter: 114.8 kg (253 lb).             No follow-ups on file.    Gary Giles is a 38 year old, presenting for the following health " issues:  ER F/U (2/24/24 chest pain , XRAY, EKG AND LABS WERE NORMAL)    History of Present Illness       Reason for visit:  Follow up from ER visit: chear pains  Symptom onset:  3-7 days ago  Symptoms include:  Cheat pains; lots of pressur  Symptom intensity:  Moderate  Symptom progression:  Staying the same  Had these symptoms before:  No  What makes it worse:  No.  What makes it better:  No.    She eats 2-3 servings of fruits and vegetables daily.She consumes 0 sweetened beverage(s) daily.She exercises with enough effort to increase her heart rate 30 to 60 minutes per day.  She exercises with enough effort to increase her heart rate 5 days per week. She is missing 1 dose(s) of medications per week.  She is not taking prescribed medications regularly due to remembering to take.           Hospital Follow-up Visit:    Hospital/Nursing Home/IP Rehab Facility: [facility]  ER visit on 2/24    Was your hospitalization related to COVID-19? No   Problems taking medications regularly:  None  Medication changes since discharge: None  Problems adhering to non-medication therapy:  None    Summary of hospitalization:  CareEverywhere information obtained and reviewed  Diagnostic Tests/Treatments reviewed.  Follow up needed: today  Other Healthcare Providers Involved in Patient s Care:         None  Update since discharge: stable.  fluctuating course.         Plan of care communicated with patient                   Review of Systems  Constitutional, HEENT, cardiovascular, pulmonary, gi and gu systems are negative, except as otherwise noted.      Objective    BP (!) 142/74   Pulse 103   Resp (!) 98   Wt 114.8 kg (253 lb)   BMI 36.30 kg/m    Body mass index is 36.3 kg/m .  Physical Exam   GENERAL: alert and no distress  NECK: no adenopathy, no asymmetry, masses, or scars  RESP: lungs clear to auscultation - no rales, rhonchi or wheezes  CV: regular rate and rhythm, normal S1 S2, no S3 or S4, no  murmur, click or rub, no peripheral edema  ABDOMEN: soft, nontender, no hepatosplenomegaly, no masses and bowel sounds normal  MS: no gross musculoskeletal defects noted, no edema            Signed Electronically by: Alvin Yoon MD

## 2024-02-28 NOTE — RESULT ENCOUNTER NOTE
Fabio Giles,     I am writing to report that your included test results are as expected.    Many labs contain some results that are slightly outside of the normal range, I have reviewed any of these results and they require no changes at this time.    Alvin Yoon MD

## 2024-03-07 ENCOUNTER — HOSPITAL ENCOUNTER (OUTPATIENT)
Dept: CARDIOLOGY | Facility: CLINIC | Age: 39
Discharge: HOME | End: 2024-03-07
Attending: FAMILY MEDICINE | Admitting: FAMILY MEDICINE
Payer: COMMERCIAL

## 2024-03-07 DIAGNOSIS — R07.89 ATYPICAL CHEST PAIN: ICD-10-CM

## 2024-03-07 PROCEDURE — 255N000002 HC RX 255 OP 636: Performed by: FAMILY MEDICINE

## 2024-03-07 PROCEDURE — 93325 DOPPLER ECHO COLOR FLOW MAPG: CPT | Mod: 26 | Performed by: INTERNAL MEDICINE

## 2024-03-07 PROCEDURE — 93325 DOPPLER ECHO COLOR FLOW MAPG: CPT | Mod: TC

## 2024-03-07 PROCEDURE — 93018 CV STRESS TEST I&R ONLY: CPT | Performed by: INTERNAL MEDICINE

## 2024-03-07 PROCEDURE — 93016 CV STRESS TEST SUPVJ ONLY: CPT | Performed by: INTERNAL MEDICINE

## 2024-03-07 PROCEDURE — 93321 DOPPLER ECHO F-UP/LMTD STD: CPT | Mod: 26 | Performed by: INTERNAL MEDICINE

## 2024-03-07 PROCEDURE — 93350 STRESS TTE ONLY: CPT | Mod: 26 | Performed by: INTERNAL MEDICINE

## 2024-03-07 RX ADMIN — HUMAN ALBUMIN MICROSPHERES AND PERFLUTREN 9 ML: 10; .22 INJECTION, SOLUTION INTRAVENOUS at 10:37

## 2024-03-11 NOTE — TELEPHONE ENCOUNTER
Central Prior Authorization Team   Phone: [phone]    PA Initiation    Medication: Ubrelvy 50mg   Insurance Company: [employer] [place] - Phone [phone] Fax [phone]  Pharmacy Filling the Rx: [id number] - [place], [place] - [address] AT [facility] OF  [address]  Filling Pharmacy Phone: [phone]  Filling Pharmacy Fax:    Start Date: 3/11/2024

## 2024-03-12 NOTE — TELEPHONE ENCOUNTER
Prior Authorization Approval    Authorization Effective Date: 2/10/2024  Authorization Expiration Date: 9/11/2024  Medication: Ubrelvy 50mg -PA APPROVED   Approved Dose/Quantity:  UP TO 16 TABLETS PER 30 DAYS   Reference #:     Insurance Company: [employer] [place] - Phone [phone] Fax [phone]  Expected CoPay:       CoPay Card Available:      Foundation Assistance Needed:    Which Pharmacy is filling the prescription (Not needed for infusion/clinic administered): [id number] - [place] [place] - [address] AT [facility] [address]  Pharmacy Notified:  Yes- **Instructed pharmacy to notify patient when script is ready to /ship.**  Patient Notified:  Yes

## 2024-03-16 ENCOUNTER — HEALTH MAINTENANCE LETTER (OUTPATIENT)
Age: 39
End: 2024-03-16

## 2024-04-08 ENCOUNTER — HOSPITAL ENCOUNTER (OUTPATIENT)
Dept: MAMMOGRAPHY | Facility: CLINIC | Age: 39
Discharge: HOME | End: 2024-04-08
Attending: FAMILY MEDICINE | Admitting: FAMILY MEDICINE
Payer: COMMERCIAL

## 2024-04-08 DIAGNOSIS — Z12.31 ENCOUNTER FOR SCREENING MAMMOGRAM FOR MALIGNANT NEOPLASM OF BREAST: ICD-10-CM

## 2024-04-08 PROCEDURE — 77063 BREAST TOMOSYNTHESIS BI: CPT

## 2024-04-15 ENCOUNTER — HOSPITAL ENCOUNTER (OUTPATIENT)
Dept: MAMMOGRAPHY | Facility: CLINIC | Age: 39
Discharge: HOME | End: 2024-04-15
Attending: FAMILY MEDICINE
Payer: COMMERCIAL

## 2024-04-15 DIAGNOSIS — R92.8 ABNORMAL MAMMOGRAM: ICD-10-CM

## 2024-04-15 PROCEDURE — 77065 DX MAMMO INCL CAD UNI: CPT | Mod: LT

## 2024-04-17 NOTE — PROGRESS NOTES
Answers submitted by the patient for this visit:  General Questionnaire (Submitted on 4/12/2024)  Chief Complaint: Chronic problems general questions HPI Form  How many servings of fruits and vegetables do you eat daily?: 2-3  On average, how many sweetened beverages do you drink each day (Examples: soda, juice, sweet tea, etc.  Do NOT count diet or artificially sweetened beverages)?: 0  How many minutes a day do you exercise enough to make your heart beat faster?: 30 to 60  How many days a week do you exercise enough to make your heart beat faster?: 5  How many days per week do you miss taking your medication?: 1  What makes it hard for you to take your medication every day?: remembering to take  General Concern (Submitted on 4/12/2024)  Chief Complaint: Chronic problems general questions HPI Form  What is the reason for your visit today?: Dermatology referal and knee pain  When did your symptoms begin?: More than a month  How would you describe these symptoms?: Moderate  Are your symptoms:: Staying the same  Have you had these symptoms before?: Yes  Have you tried or received treatment for these symptoms before?: No  Is there anything that makes you feel worse?: Walking  Is there anything that makes you feel better?: No    Right knee  Right nose lesion that comes goes.pic coming in my chart  Left shoulder pain   Went to [facility], did PT....  Shoulder so sore, good rom but pain radiates down arm to fingers with numbness worsening over time  Shoulde PT didn't help    ROS:  General and Resp. completed and negative except as noted above    Histories: reviewed    OBJECTIVE:                                                    /81   Pulse 102   Wt 115.7 kg (255 lb)   SpO2 98%   BMI 36.59 kg/m    Body mass index is 36.59 kg/m .   APPEARANCE: = Relaxed and in no distress  Lips/Teeth/Gums = No lesions seen, good dentition, and gums seem healthy  Neck = No anterior or posterior adenopathy appreciated.  Breath Sounds =  Good air movement with no rales or rhonchi in any lung fields  Musculsktl: extremities normal- no gross deformities noted, peripheral pulses normal, normal range of motion numbness of fingers  SKIN = absent significant rashes or lesions      ASSESSMENT/PLAN:                                                    Quality gaps reviewed    Tisha was seen today for knee pain, derm problem and shoulder pain.    Diagnoses and all orders for this visit:    Acute pain of left knee  Feels meniscal   Will ask Dr. Weiss to assess  -     Orthopedic  Referral; Future    Cervical radiculopathy  3 years, not helped with PT  Numbness in fingers  Locates to shoulder but seems Cervical to   -     MR Cervical Spine w/o Contrast; Future    Facial dermatitis  Will send pic and has derm appointment      Regular exercise    Health maintenance items are reviewed in Epic and correct as of today:    Alvin Yoon MD  [facility]  Family Practice  [facility]  [phone]    For any issues my office # is [phone]

## 2024-04-18 ENCOUNTER — OFFICE VISIT (OUTPATIENT)
Dept: FAMILY MEDICINE | Facility: CLINIC | Age: 39
End: 2024-04-18

## 2024-04-18 VITALS
WEIGHT: 255 LBS | BODY MASS INDEX: 36.59 KG/M2 | SYSTOLIC BLOOD PRESSURE: 137 MMHG | HEART RATE: 102 BPM | DIASTOLIC BLOOD PRESSURE: 81 MMHG | OXYGEN SATURATION: 98 %

## 2024-04-18 DIAGNOSIS — M25.562 ACUTE PAIN OF LEFT KNEE: Primary | ICD-10-CM

## 2024-04-18 DIAGNOSIS — M54.12 CERVICAL RADICULOPATHY: ICD-10-CM

## 2024-04-18 DIAGNOSIS — L30.9 FACIAL DERMATITIS: ICD-10-CM

## 2024-04-18 PROCEDURE — 99214 OFFICE O/P EST MOD 30 MIN: CPT | Performed by: FAMILY MEDICINE

## 2024-04-18 RX ORDER — VITAMIN B COMPLEX
TABLET ORAL
COMMUNITY
Start: 2023-09-13

## 2024-04-18 RX ORDER — CALCIUM CARBONATE 500(1250)
TABLET ORAL
COMMUNITY
Start: 2023-09-13

## 2024-04-18 RX ORDER — MULTIVITAMIN
TABLET ORAL
COMMUNITY
Start: 2023-09-13

## 2024-04-23 LAB
HPV ABSTRACT: NORMAL
PAP-ABSTRACT: NORMAL

## 2024-04-26 ENCOUNTER — HOSPITAL ENCOUNTER (OUTPATIENT)
Dept: MRI IMAGING | Facility: CLINIC | Age: 39
Discharge: HOME | End: 2024-04-26
Attending: FAMILY MEDICINE | Admitting: FAMILY MEDICINE
Payer: COMMERCIAL

## 2024-04-26 DIAGNOSIS — M54.12 CERVICAL RADICULOPATHY: ICD-10-CM

## 2024-04-26 PROCEDURE — 72141 MRI NECK SPINE W/O DYE: CPT

## 2024-04-29 ENCOUNTER — TRANSFERRED RECORDS (OUTPATIENT)
Dept: FAMILY MEDICINE | Facility: CLINIC | Age: 39
End: 2024-04-29

## 2024-05-08 DIAGNOSIS — E66.812 CLASS 2 OBESITY DUE TO EXCESS CALORIES WITHOUT SERIOUS COMORBIDITY WITH BODY MASS INDEX (BMI) OF 36.0 TO 36.9 IN ADULT: ICD-10-CM

## 2024-05-08 DIAGNOSIS — E66.09 CLASS 2 OBESITY DUE TO EXCESS CALORIES WITHOUT SERIOUS COMORBIDITY WITH BODY MASS INDEX (BMI) OF 36.0 TO 36.9 IN ADULT: ICD-10-CM

## 2024-05-09 ENCOUNTER — TRANSFERRED RECORDS (OUTPATIENT)
Dept: FAMILY MEDICINE | Facility: CLINIC | Age: 39
End: 2024-05-09

## 2024-05-09 RX ORDER — PHENTERMINE HYDROCHLORIDE 37.5 MG/1
37.5 TABLET ORAL
Qty: 30 TABLET | Refills: 0 | Status: SHIPPED | OUTPATIENT
Start: 2024-05-09 | End: 2024-05-30

## 2024-05-25 NOTE — PROGRESS NOTES
Band Assessment /comprehensive weight management return visit     2024    Name: Tisha Almeida  MR#: [id number]  : 1985    VITALS:          Weight: 250 lbs 14.4 oz         BMI: Body mass index is 36.52 kg/m .         Wt change since last visit (lbs): -0.1         Cumulative weight loss (lbs): 26.2    SUBJECTIVE:  Patient comes to the clinic today for band assessment and med check.  Last seen 1/15/2024.  Is on Phentermine and started topriamate last visit.  Mom diagnosed with ALL in [place].  Doing chemo  Sometimes hunger is controlled.  Otherwise it is not always the case.   Feels phentermine actually has increased her hunger.  Drive for eating has decreased with the Topiramate.  Is not always eating 3 meals a day.  Does not think she is hitting her protein goal of 60 degrees at 90 g. Thinks band is in the right setting.      AOM Considerations:  Phentermine:  Candidate, may be causing some hunger now.    Topiramate:  Candidate  GLP-1:    Candidate, not covered  Naltrexone:  Candidate, no cravings  Wellbutrin:  Candidate  Metformin:  Not diabetic, last Hg1C 2022 5.5,   Contrave:   No AOM Coverage  Qsymia: No AOM Coverage        Wt Readings from Last 10 Encounters:   24 250 lb 14.4 oz (113.8 kg)   24 255 lb (115.7 kg)   24 253 lb (114.8 kg)   24 255 lb (115.7 kg)   01/15/24 251 lb 14.4 oz (114.3 kg)   10/05/23 256 lb (116.1 kg)   23 260 lb (117.9 kg)   23 250 lb (113.4 kg)   23 248 lb (112.5 kg)   23 247 lb (112 kg)          2024     3:53 PM   Weight Loss Medication History Reviewed With Patient   Which weight loss medications are you currently taking on a regular basis? Phentermine    Topamax (topiramate)   Are you having any side effects from the weight loss medication that we have prescribed you? Yes   If you are having side effects please describe: Dry mouth. Not a big one but drives me nuts. Haha!           2024     3:53 PM   Changes and  Difficulties   I have made the following changes to my diet since my last visit: Cutting out sugar and late night eating.   With regards to my diet, I am still struggling with: Getting enough protein.   I have made the following changes to my activity/exercise since my last visit: I tore my left meniscus, im able to work out still just not as long as i would like.   With regards to my activity/exercise, I am still struggling with: Lengnth of time.     WEIGHT SELF ASSESSMENT:      5/27/2024     3:47 PM   --   Please check the boxes (1-3 boxes) which you think have been influencing your weight in the past month. I have not been active or exercising       CURRENT EXERCISE AND ACTIVITY:      5/27/2024     3:47 PM   --   Please select the statement that best describes your ability to exercise and be active in the past 4 weeks or since your last clinic visit I am as active as I can possibly be     Walks 4-5 times weekly. Plays pickle ball twice weekly. Rides bike on weekends. Will be starting strength training soon    BAND ROS:      5/27/2024     3:47 PM   --   I am hungry between meals? No   I am eating between meals? No   I am eating > 1 cup of food at meals? Yes   I am not losing 1-2 lbs a week? Yes- is able to eat healthy foods.    I do not feel a sense of restriction? No           5/27/2024     3:47 PM   --   I have pain when swallowing foods or liquids. No   I have heart burn, vomiting, or reflux. No   I have night cough or hiccups. No   I am making poor food choices (smoothies, shakes, chips). No   I am unable to eat chicken, steak, and bread. Yes           1/8/2024     8:39 AM   --   Are you pregnant? No- on Nuvaring   Will you be traveling to remote areas? No   Will you be having major surgery soon? No       H/O glaucoma:  no  Cardiovascular  CAD:   no  Palpitations:   no  HTN:    no  Gastrointestinal  GERD:   no  Constipation:   no  Gastroparesis:  no  Liver Dz:   no  H/O Pancreatitis:  Yes, when has had  gallbladder issue, gallbladder removed  H/O Gallbladder Dz: Yes, removed  Psychiatric  Moods Stable:  yes  Anxiety:   no  Depression:  no  Bipolar:  no  H/O ETOH/Drug Use no  H/O eating disorder: no  Endocrine  PMH/FMH of MTC or MEN2:  no  Neurologic:  H/O seizures:   no  Headaches:  no  Memory Impairment:  no    H/O kidney stones:  No,   Kidney disease:  No, has only 1 kidney  Current birth control:  Yes      BP Readings from Last 6 Encounters:   05/30/24 135/85   04/18/24 137/81   02/27/24 (!) 142/74   02/24/24 (!) 145/78   01/15/24 128/85   10/05/23 132/86       Pulse Readings from Last 6 Encounters:   05/30/24 112   04/18/24 102   02/27/24 103   02/24/24 92   01/15/24 96   10/05/23 88      Assessment & Plan   Problem List Items Addressed This Visit       Bariatric surgery status     Lap band Dr. Figueroa on 5/26/2011          Class 2 obesity due to excess calories without serious comorbidity with body mass index (BMI) of 36.0 to 36.9 in adult - Primary     Patient was congratulated on wt loss success thus far. Healthy habits to assist with further weight loss were discussed. Tisha will discontinue phentermine.  She will decrease Topiramate to 50 mg daily to see if this helps with her drive for hunger.    Goals eat 3 small protein rich meals a day and focus on 60 to 90 g of protein daily.  If needed supplement with fair life protein drink.    Hemoglobin A1c drawn today if elevated will start metformin           Relevant Medications    topiramate (TOPAMAX) 25 MG tablet    Other Relevant Orders    Hemoglobin A1c    Malnutrition following gastrointestinal surgery     Continue taking recommended post-op vitamins.  Labs ordered per protocol.           Relevant Orders    Hemoglobin A1c    CBC with platelets    Vitamin B12    Vitamin D Screen    Parathyroid Hormone Intact    Iron and Iron Binding Capacity    Ferritin       PATIENT INSTRUCTIONS:  Discontinue phentermine  Decrease topiramate to 50 mg daily  Labs  ordered.  Please call [phone] to set up a lab appt.   If hemoglobin A1c is elevated showing prediabetes we will start metformin    Goals:  Eat 3 protein rich meals a day  Goal protein level 60 to 90 g daily    Follow up:    Call [phone] to schedule next visit in Nov for annual visit.    Labs ordered.  Please call [phone] to set up a lab appt.

## 2024-05-30 ENCOUNTER — OFFICE VISIT (OUTPATIENT)
Dept: SURGERY | Facility: CLINIC | Age: 39
End: 2024-05-30
Payer: COMMERCIAL

## 2024-05-30 VITALS
HEIGHT: 70 IN | OXYGEN SATURATION: 95 % | WEIGHT: 250.9 LBS | DIASTOLIC BLOOD PRESSURE: 85 MMHG | SYSTOLIC BLOOD PRESSURE: 135 MMHG | HEART RATE: 112 BPM | BODY MASS INDEX: 35.92 KG/M2

## 2024-05-30 DIAGNOSIS — K91.2 MALNUTRITION FOLLOWING GASTROINTESTINAL SURGERY: ICD-10-CM

## 2024-05-30 DIAGNOSIS — E66.09 CLASS 2 OBESITY DUE TO EXCESS CALORIES WITHOUT SERIOUS COMORBIDITY WITH BODY MASS INDEX (BMI) OF 36.0 TO 36.9 IN ADULT: Primary | ICD-10-CM

## 2024-05-30 DIAGNOSIS — E66.812 CLASS 2 OBESITY DUE TO EXCESS CALORIES WITHOUT SERIOUS COMORBIDITY WITH BODY MASS INDEX (BMI) OF 36.0 TO 36.9 IN ADULT: Primary | ICD-10-CM

## 2024-05-30 DIAGNOSIS — Z98.84 BARIATRIC SURGERY STATUS: ICD-10-CM

## 2024-05-30 PROCEDURE — 99213 OFFICE O/P EST LOW 20 MIN: CPT | Performed by: PHYSICIAN ASSISTANT

## 2024-05-30 RX ORDER — TOPIRAMATE 25 MG/1
50 TABLET, FILM COATED ORAL DAILY
Qty: 180 TABLET | Refills: 1 | Status: SHIPPED | OUTPATIENT
Start: 2024-05-30

## 2024-05-30 NOTE — ASSESSMENT & PLAN NOTE
Patient was congratulated on wt loss success thus far. Healthy habits to assist with further weight loss were discussed. Tisha will discontinue phentermine.  She will decrease Topiramate to 50 mg daily to see if this helps with her drive for hunger.    Goals eat 3 small protein rich meals a day and focus on 60 to 90 g of protein daily.  If needed supplement with fair life protein drink.    Hemoglobin A1c drawn today if elevated will start metformin

## 2024-05-30 NOTE — PATIENT INSTRUCTIONS
Nice to talk with you today. Below is the plan discussed.-  DANIEL Rivero      Pt Instructions:  Discontinue phentermine  Decrease topiramate to 50 mg daily  Labs ordered.  Please call [phone] to set up a lab appt.   If hemoglobin A1c is elevated showing prediabetes we will start metformin    Goals:  Eat 3 protein rich meals a day  Goal protein level 60 to 90 g daily    Follow up:    Call [phone] to schedule next visit in Nov for annual visit.

## 2024-06-12 ENCOUNTER — LAB (OUTPATIENT)
Dept: LAB | Facility: CLINIC | Age: 39
End: 2024-06-12
Payer: COMMERCIAL

## 2024-06-12 DIAGNOSIS — E66.09 CLASS 2 OBESITY DUE TO EXCESS CALORIES WITHOUT SERIOUS COMORBIDITY WITH BODY MASS INDEX (BMI) OF 36.0 TO 36.9 IN ADULT: ICD-10-CM

## 2024-06-12 DIAGNOSIS — K91.2 MALNUTRITION FOLLOWING GASTROINTESTINAL SURGERY: ICD-10-CM

## 2024-06-12 DIAGNOSIS — E66.812 CLASS 2 OBESITY DUE TO EXCESS CALORIES WITHOUT SERIOUS COMORBIDITY WITH BODY MASS INDEX (BMI) OF 36.0 TO 36.9 IN ADULT: ICD-10-CM

## 2024-06-12 LAB
ERYTHROCYTE [DISTWIDTH] IN BLOOD BY AUTOMATED COUNT: 13 % (ref 10–15)
FERRITIN SERPL-MCNC: 43 NG/ML (ref 6–175)
HBA1C MFR BLD: 5.6 % (ref 0–5.6)
HCT VFR BLD AUTO: 42.8 % (ref 35–47)
HGB BLD-MCNC: 14.1 G/DL (ref 11.7–15.7)
IRON BINDING CAPACITY (ROCHE): 299 UG/DL (ref 240–430)
IRON SATN MFR SERPL: 25 % (ref 15–46)
IRON SERPL-MCNC: 74 UG/DL (ref 37–145)
MCH RBC QN AUTO: 29 PG (ref 26.5–33)
MCHC RBC AUTO-ENTMCNC: 32.9 G/DL (ref 31.5–36.5)
MCV RBC AUTO: 88 FL (ref 78–100)
PLATELET # BLD AUTO: 276 10E3/UL (ref 150–450)
PTH-INTACT SERPL-MCNC: 22 PG/ML (ref 15–65)
RBC # BLD AUTO: 4.87 10E6/UL (ref 3.8–5.2)
VIT D+METAB SERPL-MCNC: 60 NG/ML (ref 20–50)
WBC # BLD AUTO: 8.9 10E3/UL (ref 4–11)

## 2024-06-12 PROCEDURE — 85027 COMPLETE CBC AUTOMATED: CPT

## 2024-06-12 PROCEDURE — 83540 ASSAY OF IRON: CPT

## 2024-06-12 PROCEDURE — 82306 VITAMIN D 25 HYDROXY: CPT

## 2024-06-12 PROCEDURE — 36415 COLL VENOUS BLD VENIPUNCTURE: CPT

## 2024-06-12 PROCEDURE — 83036 HEMOGLOBIN GLYCOSYLATED A1C: CPT

## 2024-06-12 PROCEDURE — 82607 VITAMIN B-12: CPT

## 2024-06-12 PROCEDURE — 83970 ASSAY OF PARATHORMONE: CPT

## 2024-06-12 PROCEDURE — 82728 ASSAY OF FERRITIN: CPT

## 2024-06-12 PROCEDURE — 83550 IRON BINDING TEST: CPT

## 2024-06-13 LAB — VIT B12 SERPL-MCNC: 268 PG/ML (ref 232–1245)

## 2024-06-15 ASSESSMENT — MIGRAINE DISABILITY ASSESSMENT (MIDAS)
TOTAL SCORE: 0
HOW MANY DAYS IN THE PAST 3 MONTHS HAVE YOU HAD A HEADACHE: 5
HOW MANY DAYS DID YOU NOT DO HOUSEWORK BECAUSE OF HEADACHES: 0
HOW OFTEN WERE SOCIAL ACTIVITIES MISSED DUE TO HEADACHES: 0
ON A SCALE FROM 0-10 ON AVERAGE HOW PAINFUL WERE HEADACHES: 6
HOW MANY DAYS WAS HOUSEWORK PRODUCTIVITY CUT IN HALF DUE TO HEADACHES: 0
HOW MANY DAYS DID YOU MISS WORK OR SCHOOL BECAUSE OF HEADACHES: 0
HOW MANY DAYS WAS YOUR PRODUCTIVITY CUT IN HALF BECAUSE OF HEADACHES: 0

## 2024-06-15 ASSESSMENT — HEADACHE IMPACT TEST (HIT 6)
WHEN YOU HAVE HEADACHES HOW OFTEN IS THE PAIN SEVERE: VERY OFTEN
HOW OFTEN HAVE YOU FELT FED UP OR IRRITATED BECAUSE OF YOUR HEADACHES: VERY OFTEN
HOW OFTEN DO HEADACHES LIMIT YOUR DAILY ACTIVITIES: VERY OFTEN
HIT6 TOTAL SCORE: 65
HOW OFTEN DID HEADACHS LIMIT CONCENTRATION ON WORK OR DAILY ACTIVITY: VERY OFTEN
WHEN YOU HAVE A HEADACHE HOW OFTEN DO YOU WISH YOU COULD LIE DOWN: VERY OFTEN
HOW OFTEN HAVE YOU FELT TOO TIRED TO WORK BECAUSE OF YOUR HEADACHES: SOMETIMES

## 2024-06-20 ENCOUNTER — TELEPHONE (OUTPATIENT)
Dept: NEUROLOGY | Facility: CLINIC | Age: 39
End: 2024-06-20

## 2024-06-20 ENCOUNTER — VIRTUAL VISIT (OUTPATIENT)
Dept: NEUROLOGY | Facility: CLINIC | Age: 39
End: 2024-06-20
Payer: COMMERCIAL

## 2024-06-20 DIAGNOSIS — G43.109 MIGRAINE WITH AURA AND WITHOUT STATUS MIGRAINOSUS, NOT INTRACTABLE: ICD-10-CM

## 2024-06-20 PROCEDURE — 99212 OFFICE O/P EST SF 10 MIN: CPT | Mod: 95 | Performed by: NURSE PRACTITIONER

## 2024-06-20 NOTE — LETTER
6/20/2024       RE: Tisha Almeida  [address] [address] [id number]     Dear Colleague,    Thank you for referring your patient, Tisha Almeida, to the [facility] [place] at [facility]. Please see a copy of my visit note below.      [facility]    Headache Neurology Progress Note  June 20, 2024    Subjective:    Tisha Almeida returns for follow up of headaches   Total headaches now once per week and duration a day with Ubrelvy in the past 3 month. Increase in migraines from once /month to once per week due to stress, work, mother was just Dx with cancer.   Wakes up with a headache and tries acetaminophen first and than takes ubrelvy. No side effects with ubrelvy.   Discussed ubrelvy and to take it at headache onset. Once takes ubrelvy able to function and headache is manageable.  Tried prochlorperazine once and it worked and stopped the nausea   No new headache symptoms.   Started on topiramate in Jan 2024 and on 75 mg at bedtime for weight management but it does not appear to be helpful with migraines.     Objective:  Vitals: There were no vitals taken for this visit.  General: Cooperative, NAD  Neurologic:  Mental Status: Fully alert, attentive and oriented. Speech clear and fluent.   Cranial Nerves: Facial movements symmetric.   Motor: No abnormal movements.      Pertinent Investigations:            11/19/2022    12:37 PM 6/15/2024    10:58 AM   HIT-6   When you have headaches, how often is the pain severe 11 11   How often do headaches limit your ability to do usual daily activities including household work, work, school, or social activities? 13 11   When you have a headache, how often do you wish you could lie down? 13 11   In the past 4 weeks, how often have you felt too tired to do work or daily activities because of your headaches 11 10   In the past 4 weeks, how often have you felt fed  up or irritated because of your headaches 11 11   In the past 4 weeks, how often did headaches limit your ability to concentrate on work or daily activities 13 11   HIT-6 Total Score 72 65           6/15/2024    11:02 AM   MIDAS - in the past three months:   On how many days did you miss work or school because of your headaches? 0   How many days was your productivity at work or school reduced by half or more because of your headaches? 0   On how many days did you not do household work because of your headaches? 0   How many days was your productivity in household work reduced by half or more because of your headaches? 0   On how many days did you miss family, social, or leisure activities because of your headaches? 0   On how many days did you have a headache? 5   On a scale of 0-10, on average how painful were these headaches? 6   MIDAS Score 0 (I - Little or No Disability)        Assessment/Plan:   Tisha Almeida is a 38 year old   Chronic migraines increased from once per month to once per week but might be stress related. Suggested to monitor if return back if headaches were getting worse.   Acute Treatment:  Monitor headaches for worsening, frequency  -For acute treatment of mild headache, take tylenol as needed. Do not exceed more than 14 days per month to avoid medication overuse.  -For acute treatment of moderate to severe headache, take ubrelvy at the onset of headache, with a repeat dose in two hours if needed.   -For headache related nausea, or as a rescue medicine for headache, take compazine as needed.     Preventive Treatment:  -For headache prevention -  Add vit B2 200 mg daily OTC if tolerated +magnesium 400 mg daily OTC if tolerated-side effects GI symptoms     Follow up in 6-12 if headaches stable or sooner if needed     All of patient's questions were answered from the best of my knowledge.  Patient is in agreement with the plan.     17 minutes spent on the date of the encounter doing video  access, chart  review, results review,  meds review, treatment plan, documentation and further activities as noted above            Again, thank you for allowing me to participate in the care of your patient.      Sincerely,    ALYSE Cote CNP

## 2024-06-20 NOTE — TELEPHONE ENCOUNTER
Left Voicemail (1st Attempt) and Sent Mychart (1st Attempt) for the patient to call back and schedule the following:    Appointment type: Return Headache  Provider: Nia CULLEN CNP  Return date: On or near 12/20  Specialty phone number: [phone]  Additional appointment(s) needed: NA  Additonal Notes: 6mo follow up    Shellie Steele on 6/20/2024 at 2:53 PM

## 2024-06-20 NOTE — NURSING NOTE
Is the patient currently in the state of [place]? YES    Visit mode:VIDEO    If the visit is dropped, the patient can be reconnected by: TELEPHONE VISIT: Phone number:   Telephone Information:   Mobile [phone]       Will anyone else be joining the visit? NO  (If patient encounters technical issues they should call [phone]    How would you like to obtain your AVS? MyChart    Are changes needed to the allergy or medication list? Pt stated no med changes    Are refills needed on medications prescribed by this physician? NO    Reason for visit: Video Visit (Migraine follow-up )    Edie REYES

## 2024-06-20 NOTE — PATIENT INSTRUCTIONS
Acute Treatment:  Monitor headaches for worsening, frequency  -For acute treatment of mild headache, take tylenol as needed. Do not exceed more than 14 days per month to avoid medication overuse.  -For acute treatment of moderate to severe headache, take ubrelvy at the onset of headache, with a repeat dose in two hours if needed.   -For headache related nausea, or as a rescue medicine for headache, take compazine as needed.     Preventive Treatment:  -For headache prevention -  Add vit B2 200 mg daily OTC if tolerated +magnesium 400 mg daily OTC if tolerated-side effects GI symptoms     Follow up in a year if stable or sooner if needed

## 2024-06-20 NOTE — PROGRESS NOTES
Virtual Visit Details    Type of service:  Video Visit   Originating Location (pt. Location): Home  Distant Location (provider location):  Off-site  Platform used for Video Visit: [facility]    Headache Neurology Progress Note  June 20, 2024    Subjective:    Tisha Almeida returns for follow up of headaches   Total headaches now once per week and duration a day with Ubrelvy in the past 3 month. Increase in migraines from once /month to once per week due to stress, work, mother was just Dx with cancer.   Wakes up with a headache and tries acetaminophen first and than takes ubrelvy. No side effects with ubrelvy.   Discussed ubrelvy and to take it at headache onset. Once takes ubrelvy able to function and headache is manageable.  Tried prochlorperazine once and it worked and stopped the nausea   No new headache symptoms.   Started on topiramate in Jan 2024 and on 75 mg at bedtime for weight management but it does not appear to be helpful with migraines.     Objective:  Vitals: There were no vitals taken for this visit.  General: Cooperative, NAD  Neurologic:  Mental Status: Fully alert, attentive and oriented. Speech clear and fluent.   Cranial Nerves: Facial movements symmetric.   Motor: No abnormal movements.      Pertinent Investigations:            11/19/2022    12:37 PM 6/15/2024    10:58 AM   HIT-6   When you have headaches, how often is the pain severe 11 11   How often do headaches limit your ability to do usual daily activities including household work, work, school, or social activities? 13 11   When you have a headache, how often do you wish you could lie down? 13 11   In the past 4 weeks, how often have you felt too tired to do work or daily activities because of your headaches 11 10   In the past 4 weeks, how often have you felt fed up or irritated because of your headaches 11 11   In the past 4 weeks, how often did headaches limit your ability to concentrate on  work or daily activities 13 11   HIT-6 Total Score 72 65           6/15/2024    11:02 AM   MIDAS - in the past three months:   On how many days did you miss work or school because of your headaches? 0   How many days was your productivity at work or school reduced by half or more because of your headaches? 0   On how many days did you not do household work because of your headaches? 0   How many days was your productivity in household work reduced by half or more because of your headaches? 0   On how many days did you miss family, social, or leisure activities because of your headaches? 0   On how many days did you have a headache? 5   On a scale of 0-10, on average how painful were these headaches? 6   MIDAS Score 0 (I - Little or No Disability)        Assessment/Plan:   Tisha Almeida is a 38 year old   Chronic migraines increased from once per month to once per week but might be stress related. Suggested to monitor if return back if headaches were getting worse.   Acute Treatment:  Monitor headaches for worsening, frequency  -For acute treatment of mild headache, take tylenol as needed. Do not exceed more than 14 days per month to avoid medication overuse.  -For acute treatment of moderate to severe headache, take ubrelvy at the onset of headache, with a repeat dose in two hours if needed.   -For headache related nausea, or as a rescue medicine for headache, take compazine as needed.     Preventive Treatment:  -For headache prevention -  Add vit B2 200 mg daily OTC if tolerated +magnesium 400 mg daily OTC if tolerated-side effects GI symptoms     Follow up in 6-12 if headaches stable or sooner if needed     All of patient's questions were answered from the best of my knowledge.  Patient is in agreement with the plan.     17 minutes spent on the date of the encounter doing video access, chart  review, results review,  meds review, treatment plan, documentation and further activities as noted above    Nia  ALYSE Lucas, CNP [facility]  Headache certified  [facility]

## 2024-09-23 NOTE — PATIENT INSTRUCTIONS

## 2024-09-24 ENCOUNTER — OFFICE VISIT (OUTPATIENT)
Dept: FAMILY MEDICINE | Facility: CLINIC | Age: 39
End: 2024-09-24

## 2024-09-24 VITALS
WEIGHT: 253.4 LBS | HEART RATE: 83 BPM | BODY MASS INDEX: 36.88 KG/M2 | DIASTOLIC BLOOD PRESSURE: 50 MMHG | SYSTOLIC BLOOD PRESSURE: 131 MMHG

## 2024-09-24 DIAGNOSIS — G43.709 CHRONIC MIGRAINE WITHOUT AURA WITHOUT STATUS MIGRAINOSUS, NOT INTRACTABLE: ICD-10-CM

## 2024-09-24 DIAGNOSIS — Z98.84 BARIATRIC SURGERY STATUS: ICD-10-CM

## 2024-09-24 DIAGNOSIS — Z01.818 PREOP GENERAL PHYSICAL EXAM: Primary | ICD-10-CM

## 2024-09-24 DIAGNOSIS — Z90.5 SINGLE KIDNEY: ICD-10-CM

## 2024-09-24 DIAGNOSIS — S83.232D COMPLEX TEAR OF MEDIAL MENISCUS OF LEFT KNEE AS CURRENT INJURY, SUBSEQUENT ENCOUNTER: ICD-10-CM

## 2024-09-24 PROCEDURE — 99214 OFFICE O/P EST MOD 30 MIN: CPT | Performed by: FAMILY MEDICINE

## 2024-09-24 PROCEDURE — G2211 COMPLEX E/M VISIT ADD ON: HCPCS | Performed by: FAMILY MEDICINE

## 2024-09-24 NOTE — PROGRESS NOTES
Preoperative Evaluation  [facility]  [address] [place] [address]  Phone: [phone]  Fax: [phone]  Primary Provider: Alvin Yoon MD  Pre-op Performing Provider: Alvin Yoon MD  Sep 24, 2024             9/23/2024   Surgical Information   What procedure is being done? Left knee Repair torn meniscus   Facility or Hospital where procedure/surgery will be performed: [facility] [place]   Who is doing the procedure / surgery? Dr. Weiss   Date of surgery / procedure: 10/9/2024   Time of surgery / procedure: Morning. Time unknow   Where do you plan to recover after surgery? at home with family        Fax number for surgical facility: [phone]    Assessment & Plan     The proposed surgical procedure is considered INTERMEDIATE risk.    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 0.92    Chronic migraine without aura without status migrainosus, not intractable   Well controlled on current meds.          - No identified additional risk factors other than previously addressed    MEDICATION INSTRUCTIONS:  Take all scheduled medications on the day of surgery    Recommendation  Approval given to proceed with proposed procedure, without further diagnostic evaluation.    Gary Giles is a 38 year old, presenting for the following:  Pre-Op Exam (Repair torn meniscus , [facility] in [place], dr Weiss 10/9/24) and Health Maintenance (ANDRA signed for papsmear done 7/2024/Declined flu vaccine/Declines covid vaccine /)        HPI related to upcoming procedure: Knee pain on left for many months from meniscal tear now ready for intervention        9/23/2024   Pre-Op Questionnaire   Have you ever had a heart attack or stroke? No   Have you ever had  surgery on your heart or blood vessels, such as a stent placement, a coronary artery bypass, or surgery on an artery in your head, neck, heart, or legs? No   Do you have chest pain with activity? No   Do you have a history of heart failure? No   Do you currently have a cold, bronchitis or symptoms of other infection? No   Do you have a cough, shortness of breath, or wheezing? No   Do you or anyone in your family have previous history of blood clots? No   Do you or does anyone in your family have a serious bleeding problem such as prolonged bleeding following surgeries or cuts? No   Have you ever had problems with anemia or been told to take iron pills? No   Have you had any abnormal blood loss such as black, tarry or bloody stools, or abnormal vaginal bleeding? No   Have you ever had a blood transfusion? (!) YES   Have you ever had a transfusion reaction? No   Are you willing to have a blood transfusion if it is medically needed before, during, or after your surgery? Yes   Have you or any of your relatives ever had problems with anesthesia? No   Do you have sleep apnea, excessive snoring or daytime drowsiness? No   Do you have any artifical heart valves or other implanted medical devices like a pacemaker, defibrillator, or continuous glucose monitor? No   Do you have artificial joints? No   Are you allergic to latex? No        Health Care Directive  Patient does not have a Health Care Directive or Living Will:     Preoperative Review of    reviewed - no record of controlled substances prescribed.      Status of Chronic Conditions:  See problem list for active medical problems.  Problems all longstanding and stable, except as noted/documented.  See ROS for pertinent symptoms related to these conditions.    Patient Active Problem List    Diagnosis Date Noted    Class 2 obesity due to excess calories without serious comorbidity with body mass index (BMI) of 36.0 to 36.9 in adult 10/05/2023     Priority: Medium     Malnutrition following gastrointestinal surgery 10/05/2023     Priority: Medium    Fitting and adjustment of gastric lap band 10/05/2023     Priority: Medium    Overweight with body mass index (BMI) of 29 to 29.9 in adult 06/05/2023     Priority: Medium    Single kidney after Left Nephrectomy for Wilm's Tumor at 11 yo      Priority: Medium    Bariatric surgery status 06/10/2014     Priority: Medium      Past Medical History:   Diagnosis Date    Single kidney     Wilm's tumor (nephroblastoma) (H)     10 year old     Past Surgical History:   Procedure Laterality Date    CHOLECYSTECTOMY, LAPOROSCOPIC  2005    Cholecystectomy, Laparoscopic    NEPHRECTOMY  1996    Wilms tumor    TONSILLECTOMY & ADENOIDECTOMY  2004     Current Outpatient Medications   Medication Sig Dispense Refill    calcium carbonate (OS-ARELI) 500 MG TABS       MELATONIN PO Take 5 mg by mouth      multivitamin w/minerals (MULTI-VITAMIN) tablet       NUVARING 0.12-0.015 MG/24HR vaginal ring   3    prochlorperazine (COMPAZINE) 5 MG tablet Take 1-2 tablets (5-10 mg) by mouth every 6 hours as needed for nausea or vomiting (migraine) 20 tablet 3    topiramate (TOPAMAX) 25 MG tablet Take 2 tablets (50 mg) by mouth daily 180 tablet 1    ubrogepant (UBRELVY) 50 MG tablet Take 1-2 tablets ( mg) by mouth at onset of headache (migraine. May repeat in 2 hours as needed. Max 200 mg in 24 hours) 16 tablet 11    Vitamin D3 (CHOLECALCIFEROL) 25 mcg (1000 units) tablet          No Known Allergies     Social History     Tobacco Use    Smoking status: Never    Smokeless tobacco: Never   Substance Use Topics    Alcohol use: Yes     Alcohol/week: 0.0 standard drinks of alcohol     No family history on file.  History   Drug Use No             Review of Systems  Constitutional, HEENT, cardiovascular, pulmonary, GI, , musculoskeletal, neuro, skin, endocrine and psych systems are negative, except as otherwise noted.    Objective    /50   Pulse 83   Wt 114.9 kg  "(253 lb 6.4 oz)   BMI 36.88 kg/m     Estimated body mass index is 36.88 kg/m  as calculated from the following:    Height as of 5/30/24: 1.765 m (5' 9.5\").    Weight as of this encounter: 114.9 kg (253 lb 6.4 oz).  Physical Exam  GENERAL: alert and no distress  EYES: Eyes grossly normal to inspection, PERRL and conjunctivae and sclerae normal  HENT: ear canals and TM's normal, nose and mouth without ulcers or lesions  NECK: no adenopathy, no asymmetry, masses, or scars  RESP: lungs clear to auscultation - no rales, rhonchi or wheezes  CV: regular rate and rhythm, normal S1 S2, no S3 or S4, no murmur, click or rub, no peripheral edema  ABDOMEN: soft, nontender, no hepatosplenomegaly, no masses and bowel sounds normal  MS: LLE exam shows normal strength and muscle mass and click in left knee  SKIN: no suspicious lesions or rashes  NEURO: Normal strength and tone, mentation intact and speech normal  PSYCH: mentation appears normal, affect normal/bright    Recent Labs   Lab Test 06/12/24  1322 02/24/24  1011 01/17/24  1557   HGB 14.1 13.5  --     258  --    NA  --  139 141   POTASSIUM  --  3.9 4.0   CR  --  0.92 0.95   A1C 5.6  --   --         Diagnostics  No labs were ordered during this visit.   No EKG required for low risk surgery (cataract, skin procedure, breast biopsy, etc).    Revised Cardiac Risk Index (RCRI)  The patient has the following serious cardiovascular risks for perioperative complications:   - No serious cardiac risks = 0 points     RCRI Interpretation: 0 points: Class I (very low risk - 0.4% complication rate)         Signed Electronically by: Alvin Yoon MD  A copy of this evaluation report is provided to the requesting physician.  The longitudinal plan of care for the diagnosis(es)/condition(s) as documented were addressed during this visit. Due to the added complexity in care, I will continue to support Tisha in the subsequent management and with ongoing continuity of care.   "

## 2024-09-30 ENCOUNTER — TELEPHONE (OUTPATIENT)
Dept: NEUROLOGY | Facility: CLINIC | Age: 39
End: 2024-09-30
Payer: COMMERCIAL

## 2024-09-30 NOTE — TELEPHONE ENCOUNTER
Prior Authorization Retail Medication Request     Medication/Dose: Ubrelvy 50 mg  ICD code (if different than what is on RX):  G43.109  Previously Tried and Failed: Would avoid estrogen or estrogen containing products  Topirimate   Tylenol, Exedrin, Naproxen, Ibuprofen  Triptans - contraindicated   Rationale:  question hemiplegic migraine.      Insurance Name: [facility]  Insurance ID:  [id number]     Pharmacy Information (if different than what is on RX)  Name:   Phone:

## 2024-10-02 ENCOUNTER — TRANSFERRED RECORDS (OUTPATIENT)
Dept: FAMILY MEDICINE | Facility: CLINIC | Age: 39
End: 2024-10-02

## 2024-10-03 NOTE — TELEPHONE ENCOUNTER
Central Prior Authorization Team   Phone: [phone]    PA Initiation    Medication: Ubrelvy 50mg  Insurance Company: [employer] [place] - Phone [phone] Fax [phone]  Pharmacy Filling the Rx: [id number] - [place], [place] - [address] AT [facility] [address]  Filling Pharmacy Phone: [phone]  Filling Pharmacy Fax:    Start Date: 10/3/2024

## 2024-10-03 NOTE — PROGRESS NOTES
9/24/24 faxed this office note to [facility] @ [phone]    Kiran Fiore,   [facility]  [phone]

## 2024-10-07 NOTE — TELEPHONE ENCOUNTER
Prior Authorization Approval    Authorization Effective Date: 9/4/2024  Authorization Expiration Date: 10/4/2025  Medication: Ubrelvy 50mg-PA APPROVED   Approved Dose/Quantity: UP TO 16 TABLETS PER 30 DAYS    Reference #:     Insurance Company: [employer] [place] - Phone [phone] Fax [phone]  Expected CoPay:       CoPay Card Available:      Foundation Assistance Needed:    Which Pharmacy is filling the prescription (Not needed for infusion/clinic administered): [id number] [place] [facility] [place] - [address] AT [facility] [address]  Pharmacy Notified:  Yes- **Instructed pharmacy to notify patient when script is ready to /ship.**  Patient Notified:  Yes

## 2024-10-10 ENCOUNTER — APPOINTMENT (OUTPATIENT)
Dept: ULTRASOUND IMAGING | Facility: CLINIC | Age: 39
End: 2024-10-10
Attending: EMERGENCY MEDICINE
Payer: COMMERCIAL

## 2024-10-10 ENCOUNTER — HOSPITAL ENCOUNTER (EMERGENCY)
Facility: CLINIC | Age: 39
Discharge: HOME | End: 2024-10-11
Attending: EMERGENCY MEDICINE | Admitting: EMERGENCY MEDICINE
Payer: COMMERCIAL

## 2024-10-10 VITALS
RESPIRATION RATE: 18 BRPM | SYSTOLIC BLOOD PRESSURE: 135 MMHG | HEART RATE: 85 BPM | TEMPERATURE: 97.5 F | DIASTOLIC BLOOD PRESSURE: 70 MMHG | OXYGEN SATURATION: 98 %

## 2024-10-10 DIAGNOSIS — Z98.890 POSTOPERATIVE STATE: ICD-10-CM

## 2024-10-10 DIAGNOSIS — I82.462 ACUTE DEEP VEIN THROMBOSIS (DVT) OF CALF MUSCLE VEIN OF LEFT LOWER EXTREMITY (H): ICD-10-CM

## 2024-10-10 PROCEDURE — 99284 EMERGENCY DEPT VISIT MOD MDM: CPT | Mod: 25

## 2024-10-10 PROCEDURE — 93971 EXTREMITY STUDY: CPT | Mod: LT

## 2024-10-10 ASSESSMENT — COLUMBIA-SUICIDE SEVERITY RATING SCALE - C-SSRS
2. HAVE YOU ACTUALLY HAD ANY THOUGHTS OF KILLING YOURSELF IN THE PAST MONTH?: NO
6. HAVE YOU EVER DONE ANYTHING, STARTED TO DO ANYTHING, OR PREPARED TO DO ANYTHING TO END YOUR LIFE?: NO
1. IN THE PAST MONTH, HAVE YOU WISHED YOU WERE DEAD OR WISHED YOU COULD GO TO SLEEP AND NOT WAKE UP?: NO

## 2024-10-10 ASSESSMENT — ACTIVITIES OF DAILY LIVING (ADL): ADLS_ACUITY_SCORE: 35

## 2024-10-11 ENCOUNTER — TELEPHONE (OUTPATIENT)
Dept: FAMILY MEDICINE | Facility: CLINIC | Age: 39
End: 2024-10-11

## 2024-10-11 PROCEDURE — 250N000013 HC RX MED GY IP 250 OP 250 PS 637: Performed by: EMERGENCY MEDICINE

## 2024-10-11 RX ADMIN — RIVAROXABAN 15 MG: 15 TABLET, FILM COATED ORAL at 00:00

## 2024-10-11 NOTE — ED TRIAGE NOTES
Patient presents with left calf pain that started today.  She had left knee meniscus repair yesterday.  She was referred here for blood clot rule out.  She is taking daily Aspirin, otherwise no blood thinners.  CMS intact.  No swelling noted to foot or ankle.     Triage Assessment (Adult)       Row Name 10/10/24 4567          Triage Assessment    Airway WDL WDL        Respiratory WDL    Respiratory WDL WDL        Skin Circulation/Temperature WDL    Skin Circulation/Temperature WDL WDL        Cardiac WDL    Cardiac WDL WDL        Peripheral/Neurovascular WDL    Peripheral Neurovascular WDL WDL        Cognitive/Neuro/Behavioral WDL    Cognitive/Neuro/Behavioral WDL WDL

## 2024-10-11 NOTE — TELEPHONE ENCOUNTER
"ED / Discharge Outreach Protocol    Patient Contact    Attempt # 1    Was call answered?  Yes.  \"May I please speak with <patient name>\"  Is patient available?   Yes  Patient scheduled 10/16 with Car.  "

## 2024-10-11 NOTE — DISCHARGE INSTRUCTIONS
You should expect to hear from the hematology clinic in the near future.  You will need to follow-up with them in the next 3 weeks to determine how long you will need to stay on anticoagulation.  Otherwise, continue with your therapies, returning to the emergency department at any point for worsening of condition or other emergent concerns.

## 2024-10-11 NOTE — ED PROVIDER NOTES
Emergency Department Note      History of Present Illness     Chief Complaint  Leg Pain    HPI  Tisha Almeida is a 38 year old female who presents to the ED with her father for evaluation of leg pain. Patient had left knee meniscus repair yesterday and is reports left calve pain today. She was told to come in if she developed pain to see if she has an blood clot. The pain was intermittent but when she was getting ready to go to bed, she felt it more. Patient has been icing her knee and doing her PT exercises as instructed.     Independent Historian  None    Review of External Notes  Yes-I reviewed the patient's visit to her primary doctor for her preoperative evaluation earlier in the month for her meniscus surgery with [facility].  Past Medical History   Medical History and Problem List   Single kidney  Wilm's tumor (nephroblastoma)   Chronic migraine    Medications   Compazine  Topamax  Ubrelvy     Surgical History   Laparoscopic cholecystectomy  Nephrectomy  Tonsillectomy and adenoidectomy  Left knee meniscus repair   Physical Exam   Patient Vitals for the past 24 hrs:   BP Temp Temp src Pulse Resp SpO2   10/10/24 2340 135/70 -- -- 85 -- 98 %   10/10/24 2214 (!) 147/72 97.5  F (36.4  C) Temporal 85 18 100 %     Physical Exam  Musculoskeletal: There is no bony tenderness to the medial or lateral malleoli.  There is no tenderness along the fifth metatarsal.  There is no tenderness over the forefoot or the Lisfranc joint.  Normal movement of the digits.  No tenderness over the calcaneus. Mild tenderness to the mid calf without redness, warmth, or swelling.     Skin: The foot is warm with strong DP pulse and PT pulse.  No other rash or lesion.    Neuro: There is normal sensation through all 3 peripheral nerve distributions.  Strength is limited secondary to pain.    Psychiatric: Normal affect  Diagnostics   Imaging  US Lower Extremity Venous Duplex Left   Final Result   IMPRESSION:      1.  Positive  for deep venous thrombosis, with nearly occlusive thrombus in a gastrocnemius vein in the upper calf. This was reported to Dr. Trierweiler at 11:40 PM on 10/10/2024.        Report per radiology    Independent Interpretation  None  ED Course    Medications Administered  Medications   rivaroxaban ANTICOAGULANT (XARELTO) tablet 15 mg (15 mg Oral $Given 10/11/24 0000)     Procedures  Procedures     Discussion of Management  None    Additional Documentation  None    ED Course  ED Course as of 10/11/24 0030   Thu Oct 10, 2024   2227 I obtained history and examined the patient as noted above.    2340 I spoke with Radiology regarding the patient's imaging results.    2345 I rechecked the patient and explained findings. I prepared the patient to be discharged home.      Medical Decision Making / Diagnosis   CMS Diagnoses: None    MIPS     None    MDM  This pleasant 38-year-old presents to us with complaints of calf pain 24 hours after undergoing knee meniscus surgery.  With concerns for DVT, she was advised to be checked.  Surprisingly, she does indeed have a nearly occlusive thrombus in her gastrocnemius vein.  She will be initiated on rivaroxaban.  I have put in for follow-up with hematology and she was advised to keep her primary doctor and orthopedist in the loop on this.  I prescribed her first 3 weeks of Xarelto.  She will otherwise return to us for any worsening of condition or other emergent concerns.    Disposition  The patient was discharged.     ICD-10 Codes:    ICD-10-CM    1. Acute deep vein thrombosis (DVT) of calf muscle vein of left lower extremity (H)  I82.462 Adult Oncology/Hematology  Referral      2. Postoperative state  Z98.890 Adult Oncology/Hematology  Referral         Discharge Medications  Discharge Medication List as of 10/11/2024 12:00 AM        START taking these medications    Details   rivaroxaban ANTICOAGULANT (XARELTO) 15 MG TABS tablet Take 1 tablet (15 mg) by mouth 2 times  daily (with meals) for 21 days., Disp-42 tablet, R-0, E-Prescribe           Scribe Disclosure:  I, Ariana Bernabe, am serving as a scribe at 10:34 PM on 10/10/2024 to document services personally performed by Trierweiler, Chad A, MD based on my observations and the provider's statements to me.      Trierweiler, Chad A, MD  10/11/24 0031

## 2024-10-16 ENCOUNTER — OFFICE VISIT (OUTPATIENT)
Dept: FAMILY MEDICINE | Facility: CLINIC | Age: 39
End: 2024-10-16

## 2024-10-16 VITALS
SYSTOLIC BLOOD PRESSURE: 137 MMHG | HEART RATE: 94 BPM | WEIGHT: 254 LBS | BODY MASS INDEX: 36.97 KG/M2 | DIASTOLIC BLOOD PRESSURE: 61 MMHG | OXYGEN SATURATION: 97 %

## 2024-10-16 DIAGNOSIS — I82.492 ACUTE DEEP VEIN THROMBOSIS (DVT) OF OTHER SPECIFIED VEIN OF LEFT LOWER EXTREMITY (H): ICD-10-CM

## 2024-10-16 DIAGNOSIS — Z23 NEED FOR VACCINATION: Primary | ICD-10-CM

## 2024-10-16 PROCEDURE — 90471 IMMUNIZATION ADMIN: CPT | Performed by: FAMILY MEDICINE

## 2024-10-16 PROCEDURE — 99213 OFFICE O/P EST LOW 20 MIN: CPT | Mod: 25 | Performed by: FAMILY MEDICINE

## 2024-10-16 PROCEDURE — 90661 CCIIV3 VAC ABX FR 0.5 ML IM: CPT | Performed by: FAMILY MEDICINE

## 2024-10-16 RX ORDER — CEPHALEXIN 500 MG/1
500 TABLET, FILM COATED ORAL
COMMUNITY
Start: 2024-10-10

## 2024-10-16 NOTE — NURSING NOTE
1.  Has the patient received the information for the influenza vaccine? NO    2.  Does the patient have any of the following contraindications?       Allergic reaction to previous influenza vaccines? No     Any other problems to previous influenza vaccines? No     Paralyzed by Guillain-Denver syndrome? No     Currently pregnant? NO     Current moderate or severe illness? No- DVT recently     Have you had a bone marrow transplant in past 6 months? No      Vaccination given by Juanita Gooden MA October 16, 2024 2:54 PM  .  Recorded by Juanita Gooden MA

## 2024-10-16 NOTE — PROGRESS NOTES
Problem(s) Oriented visit        SUBJECTIVE:                                                    Tisha Almeida is a 38 year old female who presents to clinic today for the following health issues :  ER F/U (10/10/24 DVT prescribed Xarelto, pain is better since starting Xarelto//)    1. Need for vaccination  due  - INFLUENZA TRIVALENT VACCINE (FLUCELVAX)  - VACCINE ADMINISTRATION, INITIAL    2. Acute deep vein thrombosis (DVT) of other specified vein of left lower extremity (H)  See note from ER   Now feeling back to nrl  - rivaroxaban ANTICOAGULANT (XARELTO) 20 MG TABS tablet; Take 1 tablet (20 mg) by mouth daily (with dinner).  Dispense: 30 tablet; Refill: 4         Problem list, Medication list, Allergies, and Medical/Social/Surgical histories reviewed in [employer] and updated as appropriate.   Additional history: as documented    ROS:  General and Resp. completed and negative except as noted above    Histories: reviewed    OBJECTIVE:                                                    /61   Pulse 94   Wt 115.2 kg (254 lb)   SpO2 97%   BMI 36.97 kg/m    Body mass index is 36.97 kg/m .   Conj/Eyelids = noninjected and lids and lashes are without inflammation  Resp effort = Calm regular breathing  Breath Sounds = Good air movement with no rales or rhonchi in any lung fields  Musculsktl: extremities normal- no gross deformities noted and no calf tenderness.  Recent/Remote Memory = Alert and Oriented x 3     ASSESSMENT/PLAN:                                                    Quality gaps reviewed    Tisha was seen today for er f/u.    Diagnoses and all orders for this visit:    Need for vaccination  -     INFLUENZA TRIVALENT VACCINE (FLUCELVAX)  -     VACCINE ADMINISTRATION, INITIAL    Acute deep vein thrombosis (DVT) of other specified vein of left lower extremity (H)  -     rivaroxaban ANTICOAGULANT (XARELTO) 20 MG TABS tablet; Take 1 tablet (20 mg) by mouth daily (with dinner).    Seems provoked, one day  after ortho surgery and has a nuvo ring.  Will talk to OB    Regular exercise    Health maintenance items are reviewed in Epic and correct as of today:    Alvin Yoon MD  [facility]  Family Practice  [facility]  [phone]    For any issues my office # is [phone]        Answers submitted by the patient for this visit:  General Questionnaire (Submitted on 10/15/2024)  Chief Complaint: Chronic problems general questions HPI Form  How many days per week do you miss taking your medication?: 0  General Concern (Submitted on 10/15/2024)  Chief Complaint: Chronic problems general questions HPI Form  What is the reason for your visit today?: DVT in left leg  When did your symptoms begin?: 3-7 days ago  What are your symptoms?: Pain in caft after surgery  How would you describe these symptoms?: Severe  Are your symptoms:: Improving  Have you had these symptoms before?: No  Is there anything that makes you feel worse?: N/a  Is there anything that makes you feel better?: N/a

## 2024-11-12 NOTE — PROGRESS NOTES
[facility]  [facility]  [facility] [address], [address], [place] [address]  Main: [phone], Fax: [phone]    Video Virtual Visit Note:    Patient: Tisha Almeida  MRN: [id number]  : 1985  PARISH: December 10, 2024  Location of the patient when this video visit is conducted: Patient's home  Location of this writer at the time of this video visit is conducted: [facility].     Due to the ongoing COVID-19 outbreak, this visit was conducted by video, with the patient's approval.    Reason for visit:  Left leg DVT on 10/10/2024.     HPI:  Tisha is a 39 year old female who has a history of Wilm's tumor S/P resection as a child at age 10, S/P cholecystectomy in  for acute cholecystitis, S/P gastric lap banding surgery, who found to have a left distal leg DVT back on 10/10/2024 after her left knee meniscus repair surgery on 10/9/2024 referred by Dr. Chad Trierweiler of [facility] for consultation.     On 10/9/2024, she underwent left knee meniscus repair surgery. This was a same day surgery and she was told to rest after this surgery for about 2 days. Thus, she recalls that she was staying mostly in bed after her surgery. Then she awoke on 10/10/2024 with left calf pain and was told to go to the emergency department. There a left leg venous ultrasound was done showing nearly occlusive thrombus in a gastrocnemius vein in the upper calf. She was placed on anticoagulation therapy with rivaroxaban, referred to our clinic and was discharged.     At the time of this left leg DVT, Tisha was using Nuvaring as her contraceptive. She has since discontinued the use of Nuvaring and has an IUD placed.     Currently, she is on rivaroxaban at 20 mg PO Qday dosing. She denies any bleeding issues. She is tolerating rivaroxaban well. She is complaining of some cold feeling of her  left leg but no pain or swelling. She reports that she still have some left knee swelling after her surgery.     ROS:  Denies any bleeding complications. Specifically, no frequent epistaxis. No issues with oral mucosal bleeding. Denies any hematuria or blood in stools. Denies any shortness of breath. No chest pain. No cough. No fever.      Medications:   Current Outpatient Medications   Medication Sig Dispense Refill    calcium carbonate (OS-ARELI) 500 MG TABS       levonorgestrel (MIRENA) 52 MG (20 mcg/day) IUD by Intrauterine route once.      MELATONIN PO Take 5 mg by mouth      metFORMIN (GLUCOPHAGE XR) 500 MG 24 hr tablet Take 1 tablet (500 mg) by mouth daily (with dinner) for 7 days, THEN 2 tablets (1,000 mg) daily (with dinner) for 7 days, THEN 3 tablets (1,500 mg) daily (with dinner). 270 tablet 1    multivitamin w/minerals (MULTI-VITAMIN) tablet       prochlorperazine (COMPAZINE) 5 MG tablet Take 1-2 tablets (5-10 mg) by mouth every 6 hours as needed for nausea or vomiting (migraine) 20 tablet 3    rivaroxaban ANTICOAGULANT (XARELTO) 20 MG TABS tablet Take 1 tablet (20 mg) by mouth daily (with dinner). 30 tablet 4    topiramate (TOPAMAX) 25 MG tablet Take 2 tablets (50 mg) by mouth daily. 180 tablet 1    ubrogepant (UBRELVY) 50 MG tablet Take 1-2 tablets ( mg) by mouth at onset of headache (migraine. May repeat in 2 hours as needed. Max 200 mg in 24 hours) 16 tablet 11    Vitamin D3 (CHOLECALCIFEROL) 25 mcg (1000 units) tablet       rivaroxaban ANTICOAGULANT (XARELTO) 15 MG TABS tablet Take 1 tablet (15 mg) by mouth 2 times daily (with meals) for 21 days. 42 tablet 0     No current facility-administered medications for this visit.         Allergies:    No Known Allergies     PMH:   Past Medical History:   Diagnosis Date    Single kidney     Wilm's tumor (nephroblastoma) (H)     10 year old       Social History:   Deferred    Family History:  She has no family history of venous thromboembolism.  Parents  with no history of venous thromboembolism.  She has one brother with no history of venous thromboembolism.  She has no children of her own.     Objective:  Visual Examination via Video:  Pleasant in no acute distress.  Normal work of breathing   A+O x 3    Labs:  Component      Latest Ref Rng 6/12/2024  1:22 PM   WBC      4.0 - 11.0 10e3/uL 8.9    RBC Count      3.80 - 5.20 10e6/uL 4.87    Hemoglobin      11.7 - 15.7 g/dL 14.1    Hematocrit      35.0 - 47.0 % 42.8    MCV      78 - 100 fL 88    MCH      26.5 - 33.0 pg 29.0    MCHC      31.5 - 36.5 g/dL 32.9    RDW      10.0 - 15.0 % 13.0    Platelet Count      150 - 450 10e3/uL 276      Imaging:  Reviewed and is as described above.     Assessment:  In summary, Tisha is a 39 year old female who has a history of Wilm's tumor S/P resection as a child at age 10, S/P cholecystectomy in 2006 for acute cholecystitis, S/P gastric lap banding surgery, who found to have a left distal leg DVT back on 10/10/2024 after her left knee meniscus repair surgery on 10/9/2024 referred by Dr. Chad Trierweiler of [facility] Emergency Department for consultation. Her left distal leg DVT was a provoked event that was provoked by her surgery and post operative immobility, as well as her use of estrogen containing Nuvaring for her contraceptive.     Diagnosis:  Provoked left distal lower extremity DVT on 10/10/2024.   History of Wilm's Tumor S/P resection at age 10.   S/P cholecystectomy.     Plan:  Today, I spent some time to educate Tisha on DVT/PE, provoked vs unprovoked venous thromboembolism, differences between venous and arterial thrombosis, and the general approach in regard to anticoagulation therapy and duration. I also answered all her questions to her satisfaction.    I explain to Tisha that her left distal leg DVT on 10/10/2024 was a provoked venous thromboembolic event and thus in accordance to current American Society of Hematology (MABEL) guidelines, 3-6 months of  anticoagulation therapy should be suffice. As of today, she has completed exactly 2 months of anticoagulation therapy. I will plan to repeat her left leg venous ultrasound on or after 1/10/2025. If her thrombus has completely resolved, then I will discontinue her anticoagulation therapy. If she has remaining thrombus, I will plan to have her extend her anticoagulation therapy for another 3 months in an attempt to clear the remaining thrombus. I will call her once the repeat left leg venous ultrasound has completed.     There are no indications for thrombophilia workup in this particular case as she does not have a family history of venous thromboembolism and this DVT was clearly a provoked event.     She should avoid the use of estrogen containing products in the future. Although she is not planning to ever becoming pregnant, I explain to her that since her DVT was partially related to estrogen use, if she ever become pregnant, she will likely need pharmacological DVT/PE prophylaxis during pregnancy and for 6 weeks post partum.     I do recommend that this patient should receive aggressive mechanical DVT/PE and/or pharmacological DVT/PE prophylaxis in the future if she should be in situation for increase risk of venous thromboembolism. These situations include but not limited to: 1) Prolong immobility or hospitalization of >24 hours; 2) Surgery, especially orthopedic type surgery; 3) Traumatic injury etc....     At this time, I have no further plans to see her back on a routine basis.   Thank you for letting us to participate in this patient's care.     Video-Visit Details:  Type of service:  Video Visit  Video Start Time:  09:02  Video End Time (time video stopped): 09:25  Originating Location (pt. Location): Home  Distant Location (provider location):  [facility]   Mode of Communication:  Video Conference via AmericanSumeet Maciel PA-C, MPAS  Physician  Assistant  [facility].     45 minutes spent by me on the date of the encounter doing chart review, history and exam, documentation and further activities per the note

## 2024-11-14 ENCOUNTER — TRANSFERRED RECORDS (OUTPATIENT)
Dept: FAMILY MEDICINE | Facility: CLINIC | Age: 39
End: 2024-11-14

## 2024-11-16 NOTE — PROGRESS NOTES
Wellbutrin to Wellbutrin is a medication for depression does not look acute been on it before okay is also on a medication for smoking cessation over but it is a mild it also helps take decreased appetite for subungual is less than after exposure and then phentermine but we do not see the dry mouth      Return Bariatric Surgery Note    RE: Tisha Almeida  MR#: [id number]  : 1985  VISIT DATE: 2024    Dear Alvin Yoon,    I had the pleasure of seeing your patient, Tisha Almeida, in my post-bariatric surgery assessment clinic.    Assessment & Plan   Problem List Items Addressed This Visit       Bariatric surgery status     Lap band Dr. Figueroa  on 2011         Relevant Orders    Lap Band Adjustment - Clinic (Completed)    Class 2 severe obesity due to excess calories with serious comorbidity and body mass index (BMI) of 37.0 to 37.9 in adult (H) - Primary     Continue topiramate, Start Metformin. Creatinine WNL.  Will monitor- pt will single kidney.  Recheck in 3 months.  Band adjustment done today.          Relevant Medications    metFORMIN (GLUCOPHAGE XR) 500 MG 24 hr tablet    topiramate (TOPAMAX) 25 MG tablet    Other Relevant Orders    Basic metabolic panel    Basic metabolic panel    Malnutrition following gastrointestinal surgery     Continue taking recommended post-op vitamins.             Fitting and adjustment of gastric lap band     0.5 ml saline added to band today         Relevant Orders    Lap Band Adjustment - Clinic (Completed)     Other Visit Diagnoses       PCOS (polycystic ovarian syndrome)        Relevant Medications    metFORMIN (GLUCOPHAGE XR) 500 MG 24 hr tablet             I spent a total of 35 minutes face to face with Tisha during today's office visit. Over 50% of this time was spent counseling the patient and/or coordinating care.    PATIENT INSTRUCTIONS:  Start Metformin  Directions: Take 1 tablet by mouth daily with a meal for 1 week, then increase to 2  tablets daily with a meal, if tolerating can increase to 3 tablets daily with a meal or at bedtime.     If appetite not suppressed with metformin mychart and we can start Wellbutrin    Labs ordered.  Please call [phone] to set up a lab appt now and in 3 mo    FOLLOW-UP:  Return to clinic in May for annual visit with PA and diet.       CHIEF COMPLAINT: Post-bariatric surgery follow-up    HISTORY OF PRESENT ILLNESS:      11/17/2024     9:15 AM   Questions Regarding Prior Weight Loss Surgery Reviewed With Patient   I had the following weight loss procedure Laparoscopic Adjustable Gastric Band   What year was your surgery? 2009   How has your weight changed since your last visit? I have gained weight   Do you currently have any of the following None of the above   Do you have any concerns today? No   Recently had knee surgery.  Unable to exercise for last year due to pain and now since post op.  Had recent blood clot following surgery. On Xarelto.  Switched from Nuvaring to Mirena.      Weight Self Assessment:      11/17/2024     9:15 AM   NBS BAND WEIGHT SELF ASSESSMENT   Please check the boxes (1-3 boxes) which you think have been influencing your weight in the past month. I have not been active or exercising   Interval Hx:  Last seen 5/30/2024.  Discontinue phentermine.  Decrease Topiramate to 50 mg daily to see if this helps with her drive for hunger.   Weight History      11/17/2024     9:15 AM   --   What is your highest lifetime weight? 280   What is your lowest weight since surgery? (In pounds) 230     AOM Considerations:  Phentermine:   Candidate, dry mouth   Topiramate:      Candidate- not effective on own  GLP-1:              Candidate, not covered  Naltrexone:      Candidate, no cravings  Wellbutrin:       Candidate- if still hungry despite metformin will try next  Metformin:       Not diabetic, last Hg1C 12/1/2022 5.5, Has tolerated in the past, has hx of pcos  Contrave:        No AOM  Coverage  Qsymia:No AOM Coverage      BARIATRIC METRICS:  Initial Weight (lbs): 277.1 lbs  Current Weight: 255 lb (115.7 kg)  Body mass index is 37.12 kg/m .   Wt change since last visit (lbs): 5  Cumulative weight loss (lbs): 22.1          11/17/2024     9:15 AM   Questions Regarding Co-Morbidities and Health Concerns Reviewed With Patient   Pre-diabetes Stayed the same   Diabetes II Never   High Blood Pressure Never   High cholesterol Stayed the same   Heartburn/Reflux Gone away   Sleep apnea Never   PCOS Stayed the same   Back pain Stayed the same   Joint pain Stayed the same   Lower leg swelling Never           11/17/2024     9:15 AM   Eating Habits   How many meals do you eat per day? 2   Do you snack between meals? Sometimes   How much food are you eating at each meal? Greater than 1 cup   Are you able to separate your meals and liquids by at least 30 minutes? Sometimes   Are you able to avoid liquid calories? Sometimes           11/17/2024     9:15 AM   Exercise Questions Reviewed With Patient   How often do you exercise? 1 to 2 times per week   What is the duration of your exercise (in minutes)? 20 Minutes   What types of exercise do you do? walking   What keeps you from being more active? I am as active as I can possbily be    Pain    I have just had surgery on one or more of my joints       Social History:      11/17/2024     9:15 AM   --   Are you smoking? No   Are you drinking alcohol? No       Medications: Reviewed and Updated in Epic    ROS:      11/17/2024     9:15 AM   --   Vomiting No   Diarrhea No   Constipation No   Swallowing trouble No   Abdominal pain No   Heartburn No       Band ROS:      11/17/2024     9:15 AM   --   I am hungry between meals? Yes   I am eating between meals? Yes   I am eating > 1 cup of food at meals? Yes   I am not losing 1-2 lbs a week? Yes   I do not feel a sense of restriction? No           11/17/2024     9:15 AM   --   I have pain when swallowing foods or liquids. No  "  I have heart burn, vomiting, or reflux. No   I have night cough or hiccups. No   I am making poor food choices (smoothies, shakes, chips). No   I am unable to eat chicken, steak, and bread. No           11/17/2024     9:15 AM   --   Are you pregnant? No   Will you be traveling to remote areas? No   Will you be having major surgery soon? No       PHYSICAL EXAMINATION:  /83   Pulse 89   Ht 5' 9.5\" (1.765 m)   Wt 255 lb (115.7 kg)   SpO2 98%   BMI 37.12 kg/m     HEART: No JVD  LUNGS: Breathing unlabored.  ABD: Soft, NT, incisions well healed, port easily palpable  SKIN: No intertriginous irritation under pannus  MUSC: Gait normal  NEURO: Alert and oriented x3.     Procedure: Adjustment of gastric band  Procedure Details:  In the clinic exam room, the patient was placed in supine position on the exam table. The area over the access port was prepped with an alcohol swab, gloves were donned, and salcedo needle and syringe were directed into the port under palpation guidance. A small amount of saline was aspirated to verify location, and 0.5 mls was delivered into the port. Access need was withdrawn and bandaid was applied. Patient sat up and drank 4 ounces of lukewarm water without difficulty.     Total Fluid 5.9 ml    Patient should return to a liquid diet and advance as tolerated. Tight band warning signs were reviewed. Patient left home in a stable and ambulatory condition.          "

## 2024-11-18 ENCOUNTER — OFFICE VISIT (OUTPATIENT)
Dept: SURGERY | Facility: CLINIC | Age: 39
End: 2024-11-18
Payer: COMMERCIAL

## 2024-11-18 VITALS — WEIGHT: 255.4 LBS | BODY MASS INDEX: 37.18 KG/M2

## 2024-11-18 VITALS
WEIGHT: 255 LBS | BODY MASS INDEX: 36.51 KG/M2 | HEART RATE: 89 BPM | SYSTOLIC BLOOD PRESSURE: 125 MMHG | HEIGHT: 70 IN | DIASTOLIC BLOOD PRESSURE: 83 MMHG | OXYGEN SATURATION: 98 %

## 2024-11-18 DIAGNOSIS — K91.2 MALNUTRITION FOLLOWING GASTROINTESTINAL SURGERY: ICD-10-CM

## 2024-11-18 DIAGNOSIS — Z98.84 BARIATRIC SURGERY STATUS: Primary | ICD-10-CM

## 2024-11-18 DIAGNOSIS — Z98.84 BARIATRIC SURGERY STATUS: ICD-10-CM

## 2024-11-18 DIAGNOSIS — Z46.51 FITTING AND ADJUSTMENT OF GASTRIC LAP BAND: ICD-10-CM

## 2024-11-18 DIAGNOSIS — E66.01 CLASS 2 SEVERE OBESITY DUE TO EXCESS CALORIES WITH SERIOUS COMORBIDITY AND BODY MASS INDEX (BMI) OF 37.0 TO 37.9 IN ADULT (H): ICD-10-CM

## 2024-11-18 DIAGNOSIS — E66.812 CLASS 2 SEVERE OBESITY DUE TO EXCESS CALORIES WITH SERIOUS COMORBIDITY AND BODY MASS INDEX (BMI) OF 37.0 TO 37.9 IN ADULT (H): ICD-10-CM

## 2024-11-18 DIAGNOSIS — E66.812 CLASS 2 SEVERE OBESITY DUE TO EXCESS CALORIES WITH SERIOUS COMORBIDITY AND BODY MASS INDEX (BMI) OF 37.0 TO 37.9 IN ADULT (H): Primary | ICD-10-CM

## 2024-11-18 DIAGNOSIS — E28.2 PCOS (POLYCYSTIC OVARIAN SYNDROME): ICD-10-CM

## 2024-11-18 DIAGNOSIS — E66.01 CLASS 2 SEVERE OBESITY DUE TO EXCESS CALORIES WITH SERIOUS COMORBIDITY AND BODY MASS INDEX (BMI) OF 37.0 TO 37.9 IN ADULT (H): Primary | ICD-10-CM

## 2024-11-18 PROCEDURE — 99214 OFFICE O/P EST MOD 30 MIN: CPT | Mod: 25 | Performed by: PHYSICIAN ASSISTANT

## 2024-11-18 PROCEDURE — S2083 ADJUSTMENT GASTRIC BAND: HCPCS | Performed by: PHYSICIAN ASSISTANT

## 2024-11-18 RX ORDER — TOPIRAMATE 25 MG/1
50 TABLET, FILM COATED ORAL DAILY
Qty: 180 TABLET | Refills: 1 | Status: SHIPPED | OUTPATIENT
Start: 2024-11-18

## 2024-11-18 RX ORDER — METFORMIN HYDROCHLORIDE 500 MG/1
TABLET, EXTENDED RELEASE ORAL
Qty: 270 TABLET | Refills: 1 | Status: SHIPPED | OUTPATIENT
Start: 2024-11-18 | End: 2025-06-06

## 2024-11-18 NOTE — ASSESSMENT & PLAN NOTE
Continue topiramate, Start Metformin. Creatinine WNL.  Will monitor- pt will single kidney.  Recheck in 3 months.  Band adjustment done today.

## 2024-11-18 NOTE — PATIENT INSTRUCTIONS
Andrew Giles-  Welcome to the [facility], [place]! It was great to visit with you and learn about your progress. Below are the goals we discussed.  GOALS:  Switch to a multivitamin/mineral that meets clinic guidelines (offered suggestion)  Increase calcium to 600 mg 2X per day or 500 mg calcium 3X per day  Eat dinner daily  Take at least 20 minutes for meals  Restart exercise as able per surgical team  Thanks!  Armond Sanches RD, LD  [facility], [place]

## 2024-11-18 NOTE — PROGRESS NOTES
"NUTRITION POST OP APPOINTMENT  DATE OF VISIT: November 18, 2024    Tisha Almeida  1985  female  [id number]  39 year old     ASSESSMENT:    REASON FOR VISIT:  Tisha is a 39 year old year old female presents today for 13 years + ` 6 months PO nutrition follow-up appointment. Patient is accompanied by self.    DIAGNOSIS:  Status post laparoscopic band surgery.  Obesity Grade II BMI 35-39.9     ANTHROPOMETRICS:  Initial Weight: 280 lb   Height: 69.5\"   Current Weight: 255.4 lb    BMI: 37.18 kg/(m^2).    VITAMINS AND MINERALS:  1 Multivitamin with Minerals-gumi-AM  500 mg Calcium With Vitamin D-PM  1000 International units Vitamin D      NUTRITION HISTORY:  Breakfast: Fairlife protein drink, sourdough toast with peanut butter or butter or jelly or 2 egg bite, 1 slice toast  Lunch: leftovers from dinner- chicken dish (3 oz chicken), nann bread, salad   Supper: skips 1X per week or protein, veggie, grain  Snacks: protein drink or skinny Pop (eats out of bag) or string cheese, turkeys sticks  Fluids consumed: 72 oz water, 16 oz coffee with oat milk, protein drink, ETOH-1 drink 2 times per year (migraines)  Consuming liquid calories: Yes  Protein intake: 85-95 grams/day  Tolerate regular texture food: Yes  Any foods not tolerated details: No  If any food not tolerated: n/a  Portion size: 1 cup or more  Take 20-30 minutes to consume each meal: No   Eat protein foods first: Yes  Fluids and meals separate by at least 30 minutes: No  Chew foods thoroughly: Yes  Tolerating diet: Yes  Drinking high protein supplements: Yes  Consuming snacks per day: 1X per day  Eating behaviors: bored eating some days  Additional Information: Patient had knee surgery ~ 1 month ago so has restrictions on exercise. Moved back in with parents and works from home. Want to get back on track with weight loss. Patient does not feel Topamax is not helping with weight loss.       Medication for weight loss:  Topamax      PHYSICAL " ACTIVITY:  Type: walking dog  Frequency (days per week): 7  Duration (min): 20    DIAGNOSIS:  Previous Nutrition Diagnosis: Altered gastrointestinal function related to alteration in gastrointestinal structure as evidenced by history of laparoscopic band surgery.- no change    Previous goals:  Take all post-op vitamins/minerals per guidelines-improving  Separate fluids from meals-improving  Add in a protein supplement-met  Have 3 food groups per meal-not met  Strength train 2-3x/week-met prior to knee surgery    Current Nutrition Diagnosis: Altered gastrointestinal function related to alteration in gastrointestinal structure as evidenced by history of laparoscopic band surgery.    INTERVENTION:   Nutrition Prescription: Eat 3 meals a day at regular intervals. Consume 60-90 grams of protein daily. Follow post-surgical vitamin and mineral protocol.  Assessed learning needs and learning preferences. Reviewed and provided: Keeping up Your Diet after weight loss surgery.    GOALS:  Switch to a multivitamin/mineral that meets clinic guidelines (offered suggestion)  Increase calcium to 600 mg 2X per day or 500 mg calcium 3X per day  Eat dinner daily  Take at least 20 minutes for meals  Restart exercise as able per surgical team    Implementation: Discussed progress toward previous goals; reinforced importance of following bariatric lifestyle changes.    NUTRITION MONITORING AND EVALUATION:  Anticipated compliance: fair-good  Verbalized fair-good understanding.    Follow up: Patient to follow up in 3 months.    TIME SPENT WITH PATIENT:  28 minutes  Armond Sanches RD, STEPHANY  [facility]

## 2024-11-18 NOTE — PATIENT INSTRUCTIONS
Nice to talk with you today. Below is the plan discussed.-  DANIEL Rivero      Pt Instructions:  Start Metformin  Directions: Take 1 tablet by mouth daily with a meal for 1 week, then increase to 2 tablets daily with a meal, if tolerating can increase to 3 tablets daily with a meal or at bedtime.     If appetite not suppressed with metformin mychart and we can start Wellbutrin    Labs ordered.  Please call [phone] to set up a lab appt now and in 3 mo    Follow up:    Call [phone] to schedule next visit in May    METFORMIN    Metformin is a medication that is used to assist with lowering blood sugars in patients that have Pre-Diabetes or Diabetes. It has also been found to help with modest weight loss.    Metformin helps to lower blood sugars by lowering the amount of sugar (glucose) your liver produces that would otherwise cause your blood sugar to increase. It also makes your body respond better to insulin (the product that lowers your blood sugar).     Emerging research reported in journals suggests metformin may also lead to weight loss as a result of changes in the appetite centers of the brain, shifts in the gut microbiome, and reversal of metabolic changes that usually happen with age.    Starting instructions:  Take 1 tablet by mouth daily with a meal for 1 week, then increase to 2 tablets daily with a meal, if tolerating can increase to 3 tablets daily with a meal or at bedtime.     Side-effects. Metformin is generally well tolerated. The main side-effects we see are: Diarrhea, nausea and stomach upset - this tends to subside over time as your body gets used to the medication. Taking this medications with food will lower these side effects.

## 2024-11-20 ENCOUNTER — LAB (OUTPATIENT)
Dept: LAB | Facility: CLINIC | Age: 39
End: 2024-11-20
Payer: COMMERCIAL

## 2024-11-20 DIAGNOSIS — E66.812 CLASS 2 SEVERE OBESITY DUE TO EXCESS CALORIES WITH SERIOUS COMORBIDITY AND BODY MASS INDEX (BMI) OF 37.0 TO 37.9 IN ADULT (H): ICD-10-CM

## 2024-11-20 DIAGNOSIS — E66.01 CLASS 2 SEVERE OBESITY DUE TO EXCESS CALORIES WITH SERIOUS COMORBIDITY AND BODY MASS INDEX (BMI) OF 37.0 TO 37.9 IN ADULT (H): ICD-10-CM

## 2024-11-20 LAB
ANION GAP SERPL CALCULATED.3IONS-SCNC: 11 MMOL/L (ref 7–15)
BUN SERPL-MCNC: 9.8 MG/DL (ref 6–20)
CALCIUM SERPL-MCNC: 9.9 MG/DL (ref 8.8–10.4)
CHLORIDE SERPL-SCNC: 106 MMOL/L (ref 98–107)
CREAT SERPL-MCNC: 0.86 MG/DL (ref 0.51–0.95)
EGFRCR SERPLBLD CKD-EPI 2021: 87 ML/MIN/1.73M2
GLUCOSE SERPL-MCNC: 89 MG/DL (ref 70–99)
HCO3 SERPL-SCNC: 23 MMOL/L (ref 22–29)
POTASSIUM SERPL-SCNC: 4 MMOL/L (ref 3.4–5.3)
SODIUM SERPL-SCNC: 140 MMOL/L (ref 135–145)

## 2024-11-20 PROCEDURE — 80048 BASIC METABOLIC PNL TOTAL CA: CPT

## 2024-11-20 PROCEDURE — 36415 COLL VENOUS BLD VENIPUNCTURE: CPT

## 2024-12-10 ENCOUNTER — VIRTUAL VISIT (OUTPATIENT)
Dept: HEMATOLOGY | Facility: CLINIC | Age: 39
End: 2024-12-10
Attending: EMERGENCY MEDICINE
Payer: COMMERCIAL

## 2024-12-10 VITALS — WEIGHT: 250 LBS | BODY MASS INDEX: 36.39 KG/M2

## 2024-12-10 DIAGNOSIS — I82.462 ACUTE DEEP VEIN THROMBOSIS (DVT) OF CALF MUSCLE VEIN OF LEFT LOWER EXTREMITY (H): ICD-10-CM

## 2024-12-10 DIAGNOSIS — Z86.718 PERSONAL HISTORY OF DVT (DEEP VEIN THROMBOSIS): Primary | ICD-10-CM

## 2024-12-10 DIAGNOSIS — Z90.5 SINGLE KIDNEY: ICD-10-CM

## 2024-12-10 DIAGNOSIS — Z98.890 POSTOPERATIVE STATE: ICD-10-CM

## 2024-12-10 NOTE — NURSING NOTE
Patient was contacted to complete the pre-visit call prior to their telephone visit with the provider.     Allergies and medications were reviewed.     I thanked them for their time to cover this information.     Ese Medley, [place]

## 2024-12-10 NOTE — LETTER
[facility]  Center for Bleeding and Clotting Disorders  [facility] [address], [address], [place] [address]  Main: [phone], Fax: [phone]    Video Virtual Visit Note:    Patient: Tisha Almeida  MRN: [id number]  : 1985  PARISH: December 10, 2024  Location of the patient when this video visit is conducted: Patient's home  Location of this writer at the time of this video visit is conducted: [facility], Center for Ble[facility] Clotting Disorders.     Due to the ongoing COVID-19 outbreak, this visit was conducted by video, with the patient's approval.    Reason for visit:  Left leg DVT on 10/10/2024.     HPI:  Tisha is a 39 year old female who has a history of Wilm's tumor S/P resection as a child at age 10, S/P cholecystectomy in  for acute cholecystitis, S/P gastric lap banding surgery, who found to have a left distal leg DVT back on 10/10/2024 after her left knee meniscus repair surgery on 10/9/2024 referred by Dr. Chad Trierweiler of [facility] Emergency Department for consultation.     On 10/9/2024, she underwent left knee meniscus repair surgery. This was a same day surgery and she was told to rest after this surgery for about 2 days. Thus, she recalls that she was staying mostly in bed after her surgery. Then she awoke on 10/10/2024 with left calf pain and was told to go to the emergency department. There a left leg venous ultrasound was done showing nearly occlusive thrombus in a gastrocnemius vein in the upper calf. She was placed on anticoagulation therapy with rivaroxaban, referred to our clinic and was discharged.     At the time of this left leg DVT, Tisha was using Nuvaring as her contraceptive. She has since discontinued the use of Nuvaring and has an IUD placed.     Currently, she is on rivaroxaban at 20 mg PO Qday dosing. She denies any bleeding issues. She is tolerating rivaroxaban well. She is complaining of some cold feeling  of her left leg but no pain or swelling. She reports that she still have some left knee swelling after her surgery.     ROS:  Denies any bleeding complications. Specifically, no frequent epistaxis. No issues with oral mucosal bleeding. Denies any hematuria or blood in stools. Denies any shortness of breath. No chest pain. No cough. No fever.      Medications:   Current Outpatient Medications   Medication Sig Dispense Refill     calcium carbonate (OS-ARELI) 500 MG TABS        levonorgestrel (MIRENA) 52 MG (20 mcg/day) IUD by Intrauterine route once.       MELATONIN PO Take 5 mg by mouth       metFORMIN (GLUCOPHAGE XR) 500 MG 24 hr tablet Take 1 tablet (500 mg) by mouth daily (with dinner) for 7 days, THEN 2 tablets (1,000 mg) daily (with dinner) for 7 days, THEN 3 tablets (1,500 mg) daily (with dinner). 270 tablet 1     multivitamin w/minerals (MULTI-VITAMIN) tablet        prochlorperazine (COMPAZINE) 5 MG tablet Take 1-2 tablets (5-10 mg) by mouth every 6 hours as needed for nausea or vomiting (migraine) 20 tablet 3     rivaroxaban ANTICOAGULANT (XARELTO) 20 MG TABS tablet Take 1 tablet (20 mg) by mouth daily (with dinner). 30 tablet 4     topiramate (TOPAMAX) 25 MG tablet Take 2 tablets (50 mg) by mouth daily. 180 tablet 1     ubrogepant (UBRELVY) 50 MG tablet Take 1-2 tablets ( mg) by mouth at onset of headache (migraine. May repeat in 2 hours as needed. Max 200 mg in 24 hours) 16 tablet 11     Vitamin D3 (CHOLECALCIFEROL) 25 mcg (1000 units) tablet        rivaroxaban ANTICOAGULANT (XARELTO) 15 MG TABS tablet Take 1 tablet (15 mg) by mouth 2 times daily (with meals) for 21 days. 42 tablet 0     No current facility-administered medications for this visit.         Allergies:    No Known Allergies     PMH:   Past Medical History:   Diagnosis Date     Single kidney      Wilm's tumor (nephroblastoma) (H)     10 year old       Social History:   Deferred    Family History:  She has no family history of venous  thromboembolism.  Parents with no history of venous thromboembolism.  She has one brother with no history of venous thromboembolism.  She has no children of her own.     Objective:  Visual Examination via Video:  Pleasant in no acute distress.  Normal work of breathing   A+O x 3    Labs:  Component      Latest Ref Rng 6/12/2024  1:22 PM   WBC      4.0 - 11.0 10e3/uL 8.9    RBC Count      3.80 - 5.20 10e6/uL 4.87    Hemoglobin      11.7 - 15.7 g/dL 14.1    Hematocrit      35.0 - 47.0 % 42.8    MCV      78 - 100 fL 88    MCH      26.5 - 33.0 pg 29.0    MCHC      31.5 - 36.5 g/dL 32.9    RDW      10.0 - 15.0 % 13.0    Platelet Count      150 - 450 10e3/uL 276      Imaging:  Reviewed and is as described above.     Assessment:  In summary, Tisha is a 39 year old female who has a history of Wilm's tumor S/P resection as a child at age 10, S/P cholecystectomy in 2006 for acute cholecystitis, S/P gastric lap banding surgery, who found to have a left distal leg DVT back on 10/10/2024 after her left knee meniscus repair surgery on 10/9/2024 referred by Dr. Chad Trierweiler of [facility] Emergency Department for consultation. Her left distal leg DVT was a provoked event that was provoked by her surgery and post operative immobility, as well as her use of estrogen containing Nuvaring for her contraceptive.     Diagnosis:  Provoked left distal lower extremity DVT on 10/10/2024.   History of Wilm's Tumor S/P resection at age 10.   S/P cholecystectomy.     Plan:  Today, I spent some time to educate Tisha on DVT/PE, provoked vs unprovoked venous thromboembolism, differences between venous and arterial thrombosis, and the general approach in regard to anticoagulation therapy and duration. I also answered all her questions to her satisfaction.    I explain to Tisha that her left distal leg DVT on 10/10/2024 was a provoked venous thromboembolic event and thus in accordance to current American Society of Hematology (MABEL)  guidelines, 3-6 months of anticoagulation therapy should be suffice. As of today, she has completed exactly 2 months of anticoagulation therapy. I will plan to repeat her left leg venous ultrasound on or after 1/10/2025. If her thrombus has completely resolved, then I will discontinue her anticoagulation therapy. If she has remaining thrombus, I will plan to have her extend her anticoagulation therapy for another 3 months in an attempt to clear the remaining thrombus. I will call her once the repeat left leg venous ultrasound has completed.     There are no indications for thrombophilia workup in this particular case as she does not have a family history of venous thromboembolism and this DVT was clearly a provoked event.     She should avoid the use of estrogen containing products in the future. Although she is not planning to ever becoming pregnant, I explain to her that since her DVT was partially related to estrogen use, if she ever become pregnant, she will likely need pharmacological DVT/PE prophylaxis during pregnancy and for 6 weeks post partum.     I do recommend that this patient should receive aggressive mechanical DVT/PE and/or pharmacological DVT/PE prophylaxis in the future if she should be in situation for increase risk of venous thromboembolism. These situations include but not limited to: 1) Prolong immobility or hospitalization of >24 hours; 2) Surgery, especially orthopedic type surgery; 3) Traumatic injury etc....     At this time, I have no further plans to see her back on a routine basis.   Thank you for letting us to participate in this patient's care.     Video-Visit Details:  Type of service:  Video Visit  Video Start Time:  09:02  Video End Time (time video stopped): 09:25  Originating Location (pt. Location): Home  Distant Location (provider location):  [facility] FOR BLEEDING AND CLOTTING DISORDERS   Mode of Communication:  Video Conference via [facility]      Nathan Maciel  DANIEL, MPAS  Physician Assistant  [facility] for Bleeding and Clotting Disorders.     45 minutes spent by me on the date of the encounter doing chart review, history and exam, documentation and further activities per the note

## 2024-12-10 NOTE — PATIENT INSTRUCTIONS
Tisha,    It was nice to meet you via video visit today.     Below is a summary of our plan:  Please continue to take your rivaroxaban (Xarelto) at 20 mg by mouth every 24 hours with food at this time.   One of our nursing staffs will contact you to schedule you for a repeat left leg venous ultrasound on or after 1/10/2025.   I will contact you once the repeat left leg venous ultrasound report is back and will then determine if we can discontinue your Xarelto at that point.   If you should have any further questions or concerns, please call [phone] and ask to speak to a nursing staff.  At this time, I have no further plans to see you back on a routine basis.     Thank you once again in choosing our clinic as part of your healthcare team.      Nathan Maciel PA-C, MPAS  Physician Assistant  [facility] for Bleeding and Clotting Disorders.

## 2025-01-13 ENCOUNTER — ANCILLARY PROCEDURE (OUTPATIENT)
Dept: ULTRASOUND IMAGING | Facility: CLINIC | Age: 40
End: 2025-01-13
Attending: PHYSICIAN ASSISTANT
Payer: COMMERCIAL

## 2025-01-13 ENCOUNTER — TELEPHONE (OUTPATIENT)
Dept: HEMATOLOGY | Facility: CLINIC | Age: 40
End: 2025-01-13
Payer: COMMERCIAL

## 2025-01-13 DIAGNOSIS — Z86.718 PERSONAL HISTORY OF DVT (DEEP VEIN THROMBOSIS): ICD-10-CM

## 2025-01-13 PROCEDURE — 93971 EXTREMITY STUDY: CPT | Mod: LT

## 2025-01-13 NOTE — TELEPHONE ENCOUNTER
[facility]  Center for Bleeding and Clotting Disorders  [facility] [address], [address], [place] [address]  Main: [phone], Fax: [phone]    Telephone Note:    Patient: Tisha Almeida  MRN: [id number]  : 1985  Date of this note written: 2025    This writer call the patient on 2025 at 09:20am and communicated the report of her repeat left leg venous ultrasound done this morning. Tisha has completed 3 months of anticoagulation therapy for a provoked distal lower extremity DVT. Repeat ultrasound showed resolution of the DVT. She is instructed by this writer today to discontinue her rivaroxaban.     She has no further questions.     2025 left leg venous ultrasound:  No deep venous thrombosis in the left lower extremity. Previously seen calf DVT has resolved       Nathan Maciel PA-C, MPAS  Physician Assistant  [facility] for Bleeding and Clotting Disorders.

## 2025-02-12 NOTE — PROGRESS NOTES
"Tisha is a 39 year old who is being evaluated via a billable video visit.      The patient has been notified of following:     \"This video visit will be conducted via a call between you and your physician/provider. We have found that certain health care needs can be provided without the need for an in-person physical exam.  This service lets us provide the care you need with a video conversation.  If a prescription is necessary we can send it directly to your pharmacy.  If lab work is needed we can place an order for that and you can then stop by our lab to have the test done at a later time.    Video visits are billed at different rates depending on your insurance coverage.  Please reach out to your insurance provider with any questions.    If during the course of the call the physician/provider feels a video visit is not appropriate, you will not be charged for this service.\"    Patient has given verbal consent for Video visit? Yes    How would you like to obtain your AVS? MyChart    If the video visit is dropped, the invitation should be resent by: Text to cell phone: [phone]    Will anyone else be joining your video visit? No    I    Video-Visit Details    Type of service:  Video Visit    Originating Location (pt. Location): Home    Distant Location (provider location):   Off-Site - Provider Home Office    Platform used for Video Visit: Infoblox    Video Start Time: 8:24    Video End Time: 8:47       NUTRITION POST OP APPOINTMENT  DATE OF VISIT: February 12, 2025  Tisha is a 39 year old who is being evaluated via a billable video visit.      The patient has been notified of following:     \"This video visit will be conducted via a call between you and your physician/provider. We have found that certain health care needs can be provided without the need for an in-person physical exam.  This service lets us provide the care you need with a video conversation.  If a prescription is necessary we can send it " "directly to your pharmacy.  If lab work is needed we can place an order for that and you can then stop by our lab to have the test done at a later time.    Video visits are billed at different rates depending on your insurance coverage.  Please reach out to your insurance provider with any questions.    If during the course of the call the physician/provider feels a video visit is not appropriate, you will not be charged for this service.\"    Patient has given verbal consent for Video visit? Yes    How would you like to obtain your AVS? MyChart    If the video visit is dropped, the invitation should be resent by: Text to cell phone: [phone]    Will anyone else be joining your video visit? No    I    Video-Visit Details    Type of service:  Video Visit    Originating Location (pt. Location): Home    Distant Location (provider location):   Off-Site - Provider Home Office    Platform used for Video Visit: [email or web address]    Video Start Time: 8:58    Video End Time: 8:47       Tisha Almeida  1985  female  [id number]  39 year old     ASSESSMENT:    REASON FOR VISIT:  Tisha is a 39 year old year old female presents today for 13 year + ~ 9 months PO nutrition follow-up appointment. Patient is accompanied by self.    DIAGNOSIS:  Status post laparoscopic band surgery.  Obesity Grade II BMI 35-39.9     ANTHROPOMETRICS:  Initial Weight: 280   Height:  69.5\"  Weight at last visit: 255.4 lb  Current Weight: n/a lb    BMI:n/a  kg/(m^2).    Wt Readings from Last 5 Encounters:   12/10/24 250 lb (113.4 kg)   11/18/24 255 lb (115.7 kg)   11/18/24 255 lb 6.4 oz (115.8 kg)   10/16/24 254 lb (115.2 kg)   09/24/24 253 lb 6.4 oz (114.9 kg)        VITAMINS AND MINERALS:  1 Multivitamin with Minerals- Up and Up Women's daily-iron @ noon  600 mg Calcium With Vitamin D BID 7 am/ bed time  1000 International units Vitamin D      Not getting regular periods    MEDICATION FOR WEIGHT LOSS:  Topamax  Metformin    NUTRITION " HISTORY:  Breakfast: Fairlife protein drink, 1 egg bite  Lunch:  Max pre cooked meal-chicken/ starch/ veggie or green chili bowl  Supper: stir jaime-chicken, veggies, broccoli  Snacks: sumo orange if craving sweets  Fluids consumed: 64 oz water, protein drink,  Consuming liquid calories: Yes  Protein intake: 60-70 grams/day  Tolerate regular texture food: Yes  Any foods not tolerated details: No  Portion size: 1 cup  Take 20-30 minutes to consume each meal: No   Eat protein foods first: Yes  Fluids and meals separate by at least 30 minutes: Yes  Chew foods thoroughly: Yes  Tolerating diet: Yes  Drinking high protein supplements: Yes  Consuming snacks per day: 1  Additional Information: Does not feel like current weight loss medications are helping much. Still living with parents. Gets hungry between lunch and dinner.        PHYSICAL ACTIVITY:  Type: CNS Response-group fitness ([employer])/ walking dog  Frequency (days per week): 3-4  Duration (min): 45-60    DIAGNOSIS:  Previous Nutrition Diagnosis: Altered gastrointestinal function related to alteration in gastrointestinal structure as evidenced by history of laparoscopic band surgery.- no change    Previous goals:  Switch to a multivitamin/mineral that meets clinic guidelines (offered suggestion)-met  Increase calcium to 600 mg 2X per day or 500 mg calcium 3X per day  Eat dinner daily-met  Take at least 20 minutes for meals-improving  Restart exercise as able per surgical team-met    Current Nutrition Diagnosis: Altered gastrointestinal function related to alteration in gastrointestinal structure as evidenced by history of laparoscopic band surgery.    INTERVENTION:   Nutrition Prescription: Eat 3 meals a day at regular intervals. Consume 60-90 grams of protein daily. Follow post-surgical vitamin and mineral protocol.  Assessed learning needs and learning preferences.    GOALS:  Add in strength training 2X per week  When hungry between lunch and dinner have snack with  protein or second protein drink  Take at least 20 minuet fro dinner      Implementation: Discussed progress toward previous goals; reinforced importance of following bariatric lifestyle changes.    NUTRITION MONITORING AND EVALUATION:  Anticipated compliance: fair-good  Verbalized fair-good understanding.    Follow up: Patient to follow up in 2 months.    TIME SPENT WITH PATIENT:  20 minutes  Armond Sanches RD, LD  [facility]

## 2025-02-12 NOTE — PATIENT INSTRUCTIONS
Andrew Giles-  Welcome to the [facility]! It was great to visit with you and learn about your progress! Below are the goals we discussed.  GOALS:  Add in strength training 2X per week  When hungry between lunch and dinner have snack with protein or second protein drink  Take at least 20 minuet fro dinner      Please call [phone] to schedule your next visit with a Dietitian in 2 months.    Please reach out to your care team through [employer] with any questions or concerns.    Thanks!  Armond Sanches RD, LD  [facility]

## 2025-02-19 ENCOUNTER — VIRTUAL VISIT (OUTPATIENT)
Dept: SURGERY | Facility: CLINIC | Age: 40
End: 2025-02-19
Payer: COMMERCIAL

## 2025-02-19 DIAGNOSIS — E66.01 CLASS 2 SEVERE OBESITY DUE TO EXCESS CALORIES WITH SERIOUS COMORBIDITY AND BODY MASS INDEX (BMI) OF 37.0 TO 37.9 IN ADULT (H): Primary | ICD-10-CM

## 2025-02-19 DIAGNOSIS — E66.812 CLASS 2 SEVERE OBESITY DUE TO EXCESS CALORIES WITH SERIOUS COMORBIDITY AND BODY MASS INDEX (BMI) OF 37.0 TO 37.9 IN ADULT (H): Primary | ICD-10-CM

## 2025-02-19 DIAGNOSIS — Z98.84 BARIATRIC SURGERY STATUS: ICD-10-CM

## 2025-02-19 DIAGNOSIS — K91.2 POSTSURGICAL MALABSORPTION: ICD-10-CM

## 2025-02-19 PROCEDURE — 97803 MED NUTRITION INDIV SUBSEQ: CPT | Mod: 95 | Performed by: DIETITIAN, REGISTERED

## 2025-02-25 ENCOUNTER — OFFICE VISIT (OUTPATIENT)
Dept: FAMILY MEDICINE | Facility: CLINIC | Age: 40
End: 2025-02-25

## 2025-02-25 VITALS
DIASTOLIC BLOOD PRESSURE: 53 MMHG | HEIGHT: 70 IN | TEMPERATURE: 98.6 F | WEIGHT: 262.4 LBS | SYSTOLIC BLOOD PRESSURE: 131 MMHG | OXYGEN SATURATION: 99 % | BODY MASS INDEX: 37.56 KG/M2 | HEART RATE: 82 BPM

## 2025-02-25 DIAGNOSIS — M26.609 TMJ (TEMPOROMANDIBULAR JOINT SYNDROME): Primary | ICD-10-CM

## 2025-02-25 PROCEDURE — 3078F DIAST BP <80 MM HG: CPT | Performed by: FAMILY MEDICINE

## 2025-02-25 PROCEDURE — 99213 OFFICE O/P EST LOW 20 MIN: CPT | Performed by: FAMILY MEDICINE

## 2025-02-25 PROCEDURE — 3075F SYST BP GE 130 - 139MM HG: CPT | Performed by: FAMILY MEDICINE

## 2025-02-25 PROCEDURE — G2211 COMPLEX E/M VISIT ADD ON: HCPCS | Performed by: FAMILY MEDICINE

## 2025-02-25 NOTE — PROGRESS NOTES
"SUBJECTIVE:    Tisha Almeida, is a 39 year old female presenting for the below:     Ear pain/pressure in R ear. Gradually getting worse and moving into jaw. Declines fever.     OBJECTIVE:  Vitals:    02/25/25 1130   BP: 131/53   Pulse: 82   Temp: 98.6  F (37  C)   SpO2: 99%   Weight: 119 kg (262 lb 6.4 oz)   Height: 1.778 m (5' 10\")    Body mass index is 37.65 kg/m .  General: no acute distress, cooperative with exam.  Eyes: no injection or drainage.  Ears: TM's and canals show no erythema, discharge, or effusion bilaterally.  Throat: moist mucous membranes, no tonsillar exudate or hypertrophy, posterior oral pharynx non-erythematous without lesions.  Neck: supple, no thyroid nodules or enlargement.      ASSESSMENT / PLAN:      TMJ (temporomandibular joint syndrome)  In keeping with referred pain from jaw. Known TMJ. Awaiting fitting for night dental guard.     The longitudinal plan of care for the diagnosis(es)/condition(s) as documented were addressed during this visit. Due to the added complexity in care, I will continue to support Tisha in the subsequent management and with ongoing continuity of care.  "

## 2025-02-25 NOTE — PROGRESS NOTES
"SUBJECTIVE:    Tisha Almeida, is a 39 year old female presenting for the below:     Right ear pain. 2-3 days. Feeling well otherwise. No draining. No h/o recurrent otitis media. Now with some right jaw pain also.       OBJECTIVE:  Vitals:    02/25/25 1130   BP: 131/53   Pulse: 82   Temp: 98.6  F (37  C)   SpO2: 99%   Weight: 119 kg (262 lb 6.4 oz)   Height: 1.778 m (5' 10\")    Body mass index is 37.65 kg/m .  General: no acute distress, cooperative with exam.  Eyes: no injection or drainage.  Ears: TM's and canals show no erythema, discharge, or effusion bilaterally.  Throat: moist mucous membranes, no tonsillar exudate or hypertrophy, posterior oral pharynx non-erythematous without lesions.  Neck: supple, no thyroid nodules or enlargement.  Lungs: clear to auscultation bilaterally, normal respiratory effort.  Heart: regular rate, normal S1 and S2, no murmurs.   Abdomen: normal bowel sounds, nontender, no palpable organomegaly.  Extremities: warm, perfused, no swelling or edema.  Neuro: grossly intact   Psych: mental status normal, mood and affect appropriate.      ASSESSMENT / PLAN:      There are no diagnoses linked to this encounter.    Follow up:  Appointments in Next Year      Feb 25, 2025 11:15 AM  (Arrive by 11:00 AM)  SHORT with Evi Larry MD  [facility] ([facility]) [phone]     May 15, 2025 9:30 AM  (Arrive by 9:15 AM)  Return Bariatric Nutrition Visit with Tisha El  [facility] [place] ([facility] ) [phone]     May 15, 2025 10:00 AM  (Arrive by 9:45 AM)  Return Bariatric Visit with Josefa Diaz PA-C  [facility] [place] ([facility] ) [phone]            "

## 2025-03-20 ENCOUNTER — TRANSFERRED RECORDS (OUTPATIENT)
Dept: FAMILY MEDICINE | Facility: CLINIC | Age: 40
End: 2025-03-20

## 2025-03-22 ENCOUNTER — HEALTH MAINTENANCE LETTER (OUTPATIENT)
Age: 40
End: 2025-03-22

## 2025-04-03 ENCOUNTER — LAB (OUTPATIENT)
Dept: LAB | Facility: CLINIC | Age: 40
End: 2025-04-03
Payer: COMMERCIAL

## 2025-04-03 DIAGNOSIS — E66.812 CLASS 2 SEVERE OBESITY DUE TO EXCESS CALORIES WITH SERIOUS COMORBIDITY AND BODY MASS INDEX (BMI) OF 37.0 TO 37.9 IN ADULT (H): ICD-10-CM

## 2025-04-03 DIAGNOSIS — E66.01 CLASS 2 SEVERE OBESITY DUE TO EXCESS CALORIES WITH SERIOUS COMORBIDITY AND BODY MASS INDEX (BMI) OF 37.0 TO 37.9 IN ADULT (H): ICD-10-CM

## 2025-04-03 LAB
ANION GAP SERPL CALCULATED.3IONS-SCNC: 11 MMOL/L (ref 7–15)
BUN SERPL-MCNC: 10.9 MG/DL (ref 6–20)
CALCIUM SERPL-MCNC: 11.1 MG/DL (ref 8.8–10.4)
CHLORIDE SERPL-SCNC: 107 MMOL/L (ref 98–107)
CREAT SERPL-MCNC: 0.89 MG/DL (ref 0.51–0.95)
EGFRCR SERPLBLD CKD-EPI 2021: 83 ML/MIN/1.73M2
GLUCOSE SERPL-MCNC: 94 MG/DL (ref 70–99)
HCO3 SERPL-SCNC: 24 MMOL/L (ref 22–29)
POTASSIUM SERPL-SCNC: 4 MMOL/L (ref 3.4–5.3)
SODIUM SERPL-SCNC: 142 MMOL/L (ref 135–145)

## 2025-04-16 ENCOUNTER — HOSPITAL ENCOUNTER (OUTPATIENT)
Dept: MAMMOGRAPHY | Facility: CLINIC | Age: 40
Discharge: HOME | End: 2025-04-16
Attending: FAMILY MEDICINE
Payer: COMMERCIAL

## 2025-04-16 DIAGNOSIS — R92.8 BI-RADS CATEGORY 3 MAMMOGRAM RESULT: ICD-10-CM

## 2025-04-16 PROCEDURE — 76642 ULTRASOUND BREAST LIMITED: CPT | Mod: LT

## 2025-04-16 PROCEDURE — 77062 BREAST TOMOSYNTHESIS BI: CPT

## 2025-04-25 ENCOUNTER — HOSPITAL ENCOUNTER (OUTPATIENT)
Dept: MAMMOGRAPHY | Facility: CLINIC | Age: 40
Discharge: HOME | End: 2025-04-25
Attending: FAMILY MEDICINE
Payer: COMMERCIAL

## 2025-04-25 DIAGNOSIS — R92.8 BI-RADS CATEGORY 3 MAMMOGRAM RESULT: ICD-10-CM

## 2025-04-25 PROCEDURE — 88305 TISSUE EXAM BY PATHOLOGIST: CPT | Mod: TC | Performed by: FAMILY MEDICINE

## 2025-04-25 PROCEDURE — 250N000009 HC RX 250: Performed by: FAMILY MEDICINE

## 2025-04-25 PROCEDURE — 250N000011 HC RX IP 250 OP 636: Performed by: FAMILY MEDICINE

## 2025-04-25 PROCEDURE — 272N000715 MA STEREOTACTIC BREAST BIOPSY VACUUM LT

## 2025-04-25 PROCEDURE — 999N000065 MA POST PROCEDURE LEFT

## 2025-04-25 RX ORDER — LIDOCAINE HYDROCHLORIDE AND EPINEPHRINE 10; 10 MG/ML; UG/ML
10 INJECTION, SOLUTION INFILTRATION; PERINEURAL ONCE
Status: COMPLETED | OUTPATIENT
Start: 2025-04-25 | End: 2025-04-25

## 2025-04-25 RX ADMIN — LIDOCAINE HYDROCHLORIDE,EPINEPHRINE BITARTRATE 10 ML: 10; .01 INJECTION, SOLUTION INFILTRATION; PERINEURAL at 10:17

## 2025-04-25 RX ADMIN — LIDOCAINE HYDROCHLORIDE 5 ML: 10 INJECTION, SOLUTION INFILTRATION; PERINEURAL at 10:16

## 2025-04-25 NOTE — DISCHARGE INSTRUCTIONS
After Your Breast Biopsy  Bleeding, bruising, and pain  Breast tenderness and some bruising is normal and may last several days. You may wear your bra overnight to support the breast.  You may use an ice pack for pain. Place it over the area for 15 to 20 minutes, several times a day.  You may take over-the-counter pain medicine:  On the day of the biopsy, we recommend Tylenol (acetaminophen) because it does not raise your risk of bleeding.  The next day, you may take an anti-inflammatory medicine (aspirin, ibuprofen, Motrin, Aleve, Advil), unless your doctor tells you not to.  Bandages and showering  Keep your bandage in place until tomorrow morning. Don't get it wet.  If you have small pieces of tape on the skin, leave them in place. They will fall off on their own, or you can remove them after 5 days.  You may shower the next morning after your biopsy.  Activity  No heavy activity (no running, no gym workouts, no lifting, no vacuuming, etc.) on the day of your biopsy.  You may go back to normal activity the next day. But limit what you do if you still have pain or discomfort.  Infection  Infection is rare. Signs of infection include:  Fever (including sweats and chills)  Redness  Pain that gets worse  Fluid draining from the biopsy site  Biopsy results  Results may take up to 5 business days.  A nurse or doctor from the Breast Center will call with your results. We will also send the results to the doctor that ordered your biopsy.  If you have not gotten your results in 5 days, please call the Breast Center.  Call the Breast Center with questions or if:   You have bleeding that lasts more than 20 minutes.  You have pain that you can't control.  You have signs of infection (fever, sweats, chills, redness, increasing pain, or drainage).  After hours, please call the doctor who ordered your biopsy.  For informational purposes only. Not to replace the advice of your health care provider. Copyright   2010 [facility]. All rights reserved. Clinically reviewed by Teodora Fuller, Director, [facility]. [employer] [id number] - REV 08/23.

## 2025-04-28 LAB
PATH REPORT.COMMENTS IMP SPEC: NORMAL
PATH REPORT.FINAL DX SPEC: NORMAL
PATH REPORT.GROSS SPEC: NORMAL
PATH REPORT.MICROSCOPIC SPEC OTHER STN: NORMAL
PHOTO IMAGE: NORMAL

## 2025-04-29 ENCOUNTER — OFFICE VISIT (OUTPATIENT)
Dept: FAMILY MEDICINE | Facility: CLINIC | Age: 40
End: 2025-04-29

## 2025-04-29 ENCOUNTER — TELEPHONE (OUTPATIENT)
Dept: MAMMOGRAPHY | Facility: CLINIC | Age: 40
End: 2025-04-29
Payer: COMMERCIAL

## 2025-04-29 VITALS
HEART RATE: 87 BPM | OXYGEN SATURATION: 100 % | HEIGHT: 69 IN | DIASTOLIC BLOOD PRESSURE: 67 MMHG | BODY MASS INDEX: 38.78 KG/M2 | WEIGHT: 261.8 LBS | SYSTOLIC BLOOD PRESSURE: 121 MMHG

## 2025-04-29 DIAGNOSIS — I82.502 CHRONIC VENOUS EMBOLISM AND THROMBOSIS OF DEEP VESSELS OF LEFT LOWER EXTREMITY (H): ICD-10-CM

## 2025-04-29 DIAGNOSIS — E83.52 HYPERCALCEMIA: Primary | ICD-10-CM

## 2025-04-29 DIAGNOSIS — R23.1 COOL SKIN: ICD-10-CM

## 2025-04-29 LAB
ALBUMIN SERPL BCG-MCNC: 4.1 G/DL (ref 3.5–5.2)
ALP SERPL-CCNC: 87 U/L (ref 40–150)
ALT SERPL W P-5'-P-CCNC: 25 U/L (ref 0–50)
ANION GAP SERPL CALCULATED.3IONS-SCNC: 13 MMOL/L (ref 7–15)
AST SERPL W P-5'-P-CCNC: 23 U/L (ref 0–45)
BILIRUB SERPL-MCNC: 0.2 MG/DL
BUN SERPL-MCNC: 12.8 MG/DL (ref 6–20)
CALCIUM SERPL-MCNC: 9.5 MG/DL (ref 8.8–10.4)
CHLORIDE SERPL-SCNC: 107 MMOL/L (ref 98–107)
CREAT SERPL-MCNC: 0.88 MG/DL (ref 0.51–0.95)
EGFRCR SERPLBLD CKD-EPI 2021: 84 ML/MIN/1.73M2
FASTING STATUS PATIENT QL REPORTED: YES
GLUCOSE SERPL-MCNC: 92 MG/DL (ref 70–99)
HCO3 SERPL-SCNC: 22 MMOL/L (ref 22–29)
POTASSIUM SERPL-SCNC: 4.2 MMOL/L (ref 3.4–5.3)
PROT SERPL-MCNC: 6.7 G/DL (ref 6.4–8.3)
PTH-INTACT SERPL-MCNC: 25 PG/ML (ref 15–65)
SODIUM SERPL-SCNC: 142 MMOL/L (ref 135–145)

## 2025-04-29 PROCEDURE — 80053 COMPREHEN METABOLIC PANEL: CPT | Performed by: FAMILY MEDICINE

## 2025-04-29 PROCEDURE — 3074F SYST BP LT 130 MM HG: CPT | Performed by: FAMILY MEDICINE

## 2025-04-29 PROCEDURE — G2211 COMPLEX E/M VISIT ADD ON: HCPCS | Performed by: FAMILY MEDICINE

## 2025-04-29 PROCEDURE — 99214 OFFICE O/P EST MOD 30 MIN: CPT | Performed by: FAMILY MEDICINE

## 2025-04-29 PROCEDURE — 3078F DIAST BP <80 MM HG: CPT | Performed by: FAMILY MEDICINE

## 2025-04-29 PROCEDURE — 36415 COLL VENOUS BLD VENIPUNCTURE: CPT | Performed by: FAMILY MEDICINE

## 2025-04-29 PROCEDURE — 83970 ASSAY OF PARATHORMONE: CPT | Performed by: FAMILY MEDICINE

## 2025-04-29 NOTE — TELEPHONE ENCOUNTER
Call placed to Tisha.   verified.     Tisha was notified that pathology results from her 2025 LEFT BREAST BIOPSY revealed:     [facility]  Tisha Almeida [id number]  F, 1985  Surgical Pathology Report (Final result) [id number]  Authorizing Provider: Alvin Yoon MD Ordering Provider: Alvin Yoon MD  Ordering Location: [facility]  Collected: 2025 10:25 AM  Pathologist: Sona Fountain MD Received: 2025 11:00 AM  .  Specimens  A Breast, Left, Breast Stereo Biopsy  .  .  Final Diagnosis  LEFT BREAST, STEREOTACTIC CORE BIOPSIES:  1. FATTY BREAST TISSUE WITH FIBROSIS WITH FOREIGN BODY GRANULOMATOUS INFLAMMATION,  HEMOSIDERIN DEPOSITION AND DYSTROPHIC CALCIFICATIONS  2. NEGATIVE FOR PROLIFERATIVE LESIONS, IN SITU AND INVASIVE MALIGNANCY  Electronically signed by Sona Fountain MD on 2025 at 5:17 PM     Per [facility] Radiologist, Dr. Nathan Gonsalez , results are concordant with imaging findings.     Recommendation: Annual Screening Mammograms     Tisha denies any concerns with the biopsy site. Ordering provider was informed of the results and follow up plan.  I encouraged her to contact her doctor with any further breast concerns.  Patient verbalized understanding and agrees with the plan of care.        Yaneth Mccabe RN BSN  Procedure Nurse  [facility]  [phone]

## 2025-04-29 NOTE — PROGRESS NOTES
"Left leg is colder than the right and looks at times off color  Walking on the legs is ok   No real claudication symtpoms.  Had been on xarelto for a DVT after Meniscus therapy     Hypercalcemia at [facility] surgery.  ROS:  General and Resp. completed and negative except as noted above    Histories: reviewed    OBJECTIVE:                                                    /67   Pulse 87   Ht 1.759 m (5' 9.25\")   Wt 118.8 kg (261 lb 12.8 oz)   SpO2 100%   BMI 38.38 kg/m    Body mass index is 38.38 kg/m .   APPEARANCE: = Relaxed and in no distress  Ear canals and TM's = Canals are not inflammed and have none or little wax and the drums are not injected and have a light reflex   Thyroid = Not enlarged and no masses felt  Resp effort = Calm regular breathing  Breath Sounds = Good air movement with no rales or rhonchi in any lung fields  SKIN: left leg is a bit cooler   Recent/Remote Memory = Alert and Oriented x 3     ASSESSMENT/PLAN:                                                    Quality gaps reviewed    Tisha was seen today for labs and consult.    Diagnoses and all orders for this visit:    Hypercalcemia  Recheck labs.  -     Comprehensive metabolic panel; Future  -     VENOUS COLLECTION  -     Parathyroid Hormone Intact; Future        History Chronic venous embolism and thrombosis of deep vessels of left lower extremity (H)  Cool skin  -     Vascular Surgery Referral; Future-     Comprehensive metabolic panel; Future    Large DVTs this year and skin coolness, will check with vasc surgery to be sure isn't arterially compromosized in some way     See Patient Instructions    Health maintenance items are reviewed in Epic and correct as of today:    Alvin Yoon MD  [facility]  Family Practice  [facility]  [phone]    For any issues my office # is [phone]      The longitudinal plan of care for the diagnosis(es)/condition(s) as documented were addressed during this " visit. Due to the added complexity in care, I will continue to support Tisha in the subsequent management and with ongoing continuity of care.    Answers submitted by the patient for this visit:  General Questionnaire (Submitted on 4/28/2025)  Chief Complaint: Chronic problems general questions HPI Form  How many days per week do you miss taking your medication?: 2  What makes it hard for you to take your medication every day?: remembering to take  General Concern (Submitted on 4/28/2025)  Chief Complaint: Chronic problems general questions HPI Form  What is the reason for your visit today?: Fu on bloodwork and cold leg  When did your symptoms begin?: More than a month  What are your symptoms?: High calcium blood levels, left leg is colder than right. Clot doc thought possible PE?  How would you describe these symptoms?: Mild  Are your symptoms:: Staying the same  Have you had these symptoms before?: Yes  Have you tried or received treatment for these symptoms before?: No  Is there anything that makes you feel worse?: No  Is there anything that makes you feel better?: No  Questionnaire about: Chronic problems general questions HPI Form (Submitted on 4/28/2025)  Chief Complaint: Chronic problems general questions HPI Form

## 2025-05-15 ENCOUNTER — OFFICE VISIT (OUTPATIENT)
Dept: SURGERY | Facility: CLINIC | Age: 40
End: 2025-05-15
Payer: COMMERCIAL

## 2025-05-15 ENCOUNTER — HOSPITAL ENCOUNTER (OUTPATIENT)
Dept: ULTRASOUND IMAGING | Facility: CLINIC | Age: 40
Discharge: HOME | End: 2025-05-15
Attending: INTERNAL MEDICINE
Payer: COMMERCIAL

## 2025-05-15 ENCOUNTER — OFFICE VISIT (OUTPATIENT)
Dept: OTHER | Facility: CLINIC | Age: 40
End: 2025-05-15
Attending: INTERNAL MEDICINE
Payer: COMMERCIAL

## 2025-05-15 VITALS
DIASTOLIC BLOOD PRESSURE: 86 MMHG | SYSTOLIC BLOOD PRESSURE: 128 MMHG | OXYGEN SATURATION: 97 % | HEIGHT: 72 IN | WEIGHT: 257 LBS | HEART RATE: 90 BPM | BODY MASS INDEX: 34.81 KG/M2

## 2025-05-15 VITALS
WEIGHT: 260 LBS | DIASTOLIC BLOOD PRESSURE: 87 MMHG | OXYGEN SATURATION: 100 % | HEART RATE: 95 BPM | SYSTOLIC BLOOD PRESSURE: 120 MMHG | BODY MASS INDEX: 35.26 KG/M2

## 2025-05-15 DIAGNOSIS — Z86.718 HISTORY OF DEEP VENOUS THROMBOSIS: ICD-10-CM

## 2025-05-15 DIAGNOSIS — Z98.84 BARIATRIC SURGERY STATUS: ICD-10-CM

## 2025-05-15 DIAGNOSIS — E28.2 PCOS (POLYCYSTIC OVARIAN SYNDROME): ICD-10-CM

## 2025-05-15 DIAGNOSIS — Z90.5 SINGLE KIDNEY: ICD-10-CM

## 2025-05-15 DIAGNOSIS — E66.811 CLASS 1 OBESITY DUE TO EXCESS CALORIES WITH SERIOUS COMORBIDITY AND BODY MASS INDEX (BMI) OF 34.0 TO 34.9 IN ADULT: Primary | ICD-10-CM

## 2025-05-15 DIAGNOSIS — I73.00 RAYNAUD'S DISEASE WITHOUT GANGRENE: ICD-10-CM

## 2025-05-15 DIAGNOSIS — M79.89 LEFT LEG SWELLING: ICD-10-CM

## 2025-05-15 DIAGNOSIS — E66.09 CLASS 1 OBESITY DUE TO EXCESS CALORIES WITH SERIOUS COMORBIDITY AND BODY MASS INDEX (BMI) OF 34.0 TO 34.9 IN ADULT: Primary | ICD-10-CM

## 2025-05-15 DIAGNOSIS — M79.89 LEFT LEG SWELLING: Primary | ICD-10-CM

## 2025-05-15 DIAGNOSIS — K91.2 POSTSURGICAL MALABSORPTION: ICD-10-CM

## 2025-05-15 PROCEDURE — 93971 EXTREMITY STUDY: CPT | Mod: LT

## 2025-05-15 PROCEDURE — 99213 OFFICE O/P EST LOW 20 MIN: CPT | Performed by: INTERNAL MEDICINE

## 2025-05-15 RX ORDER — BUPROPION HYDROCHLORIDE 150 MG/1
TABLET, EXTENDED RELEASE ORAL
Qty: 180 TABLET | Refills: 1 | Status: SHIPPED | OUTPATIENT
Start: 2025-05-15

## 2025-05-15 RX ORDER — NALTREXONE HYDROCHLORIDE 50 MG/1
TABLET, FILM COATED ORAL
Qty: 90 TABLET | Refills: 1 | Status: SHIPPED | OUTPATIENT
Start: 2025-05-15

## 2025-05-15 NOTE — ASSESSMENT & PLAN NOTE
No response with Topiramate and metformin. Will discontinue Topiramate. Cont. Metformin.  Start Bupropion and Naltrexone Risks/ benefits and possible side effects were discussed and questions were answered. Written information was given.  CMP WNL.  Look into option for revision surgery for band non response.

## 2025-05-15 NOTE — PROGRESS NOTES
[facility]        Patient is here for a consult to discuss history of DVT and skin coolness in left lower extremity.     Pt is currently taking no meds that would impact our treatment plan.    /88 (BP Location: Right arm, Patient Position: Sitting, Cuff Size: Adult Regular)   Pulse 95   Wt 260 lb (117.9 kg)   SpO2 100%   BMI 35.26 kg/m      The provider has been notified that the patient has no concerns.     Questions patient would like addressed today are: N/A.    Refills are needed: N/A    Has homecare services and agency name:  Jimena Rivero MA

## 2025-05-15 NOTE — PATIENT INSTRUCTIONS
Your leg ultrasound today no DVT     Use compression stockings 20-30 mm hg day time and elevate legs     If raynaud's flare up in the winter months we can treat with amlodipine or nifedipine or nitroglycerine ointment etc

## 2025-05-15 NOTE — PROGRESS NOTES
[facility] INITIAL VASCULAR MEDICINE CONSULT    ( New patient visit)     PRIMARY HEALTH CARE PROVIDER:  Alvin Yoon MD      REFERRING HEALTH CARE PROVIDER;  Alvin Yoon MD      REASON FOR CONSULT: Evaluation and management of left leg swelling with bilateral cold feet      HPI: Tisha Almeida is a 39 year old very pleasant female  with past medical history of Wilms tumor underwent nephrectomy at age 10, status postcholecystectomy in 2006 for acute cholecystitis and also history of gastric lap banding surgery underwent left lower extremity meniscal tear repair in October 2020 for followed by left leg swelling and diagnosed deep venous thrombosis treated 3-month duration of anticoagulation subsequent ultrasound resolved clot earlier seen and evaluated at bleeding and clotting disorders now here for cold toes and left leg swelling.  No recent travel.  No chest pain or shortness of breath.  During winter months or whenever she exposures to the cold having discoloration of the toes but hands are not affected.  She is new to me reviewed available records in the epic    PAST MEDICAL HISTORY  Past Medical History:   Diagnosis Date    Single kidney     Wilm's tumor (nephroblastoma) (H)     10 year old       CURRENT MEDICATIONS  Current Outpatient Medications   Medication Sig Dispense Refill    buPROPion (WELLBUTRIN SR) 150 MG 12 hr tablet Take 1 tablet for 1 week, if tolerating increase to 1 tablet twice daily 180 tablet 1    calcium carbonate (OS-ARELI) 500 MG TABS       levonorgestrel (MIRENA) 52 MG (20 mcg/day) IUD by Intrauterine route once.      MELATONIN PO Take 5 mg by mouth      metFORMIN (GLUCOPHAGE XR) 500 MG 24 hr tablet Take 1 tablet (500 mg) by mouth daily (with dinner) for 7 days, THEN 2 tablets (1,000 mg) daily (with dinner) for 7 days, THEN 3 tablets (1,500 mg) daily (with dinner). 270 tablet 1    multivitamin w/minerals (MULTI-VITAMIN) tablet        naltrexone (DEPADE/REVIA) 50 MG tablet Take 1/4-1/2 tablet in am for 7 days, then increase to 1/2 tablet twice daily. 90 tablet 1    prochlorperazine (COMPAZINE) 5 MG tablet Take 1-2 tablets (5-10 mg) by mouth every 6 hours as needed for nausea or vomiting (migraine) 20 tablet 3    topiramate (TOPAMAX) 25 MG tablet Take 2 tablets (50 mg) by mouth daily. 180 tablet 1    ubrogepant (UBRELVY) 50 MG tablet Take 1-2 tablets ( mg) by mouth at onset of headache (migraine. May repeat in 2 hours as needed. Max 200 mg in 24 hours) 16 tablet 11    Vitamin D3 (CHOLECALCIFEROL) 25 mcg (1000 units) tablet        No current facility-administered medications for this visit.       PAST SURGICAL HISTORY:  Past Surgical History:   Procedure Laterality Date    CHOLECYSTECTOMY, LAPOROSCOPIC  2005    Cholecystectomy, Laparoscopic    NEPHRECTOMY  1996    Wilms tumor    TONSILLECTOMY & ADENOIDECTOMY  2004       ALLERGIES   No Known Allergies    FAMILY HISTORY  History reviewed. No pertinent family history.          SOCIAL HISTORY  Social History     Socioeconomic History    Marital status: Single     Spouse name: Boyfriend Jean    Number of children: Not on file    Years of education: Not on file    Highest education level: Not on file   Occupational History    Occupation:    Tobacco Use    Smoking status: Never    Smokeless tobacco: Never   Substance and Sexual Activity    Alcohol use: Yes     Alcohol/week: 0.0 standard drinks of alcohol    Drug use: No    Sexual activity: Yes     Partners: Male   Other Topics Concern    Not on file   Social History Narrative    Not on file     Social Drivers of Health     Financial Resource Strain: Low Risk  (4/28/2025)    Financial Resource Strain     Within the past 12 months, have you or your family members you live with been unable to get utilities (heat, electricity) when it was really needed?: No   Food Insecurity: Low Risk  (4/28/2025)    Food Insecurity     Within the  past 12 months, did you worry that your food would run out before you got money to buy more?: No     Within the past 12 months, did the food you bought just not last and you didn t have money to get more?: No   Transportation Needs: Low Risk  (4/28/2025)    Transportation Needs     Within the past 12 months, has lack of transportation kept you from medical appointments, getting your medicines, non-medical meetings or appointments, work, or from getting things that you need?: No   Physical Activity: Not on file   Stress: Not on file   Social Connections: Not on file   Interpersonal Safety: Not on file   Housing Stability: Low Risk  (4/28/2025)    Housing Stability     Do you have housing? : Yes     Are you worried about losing your housing?: No       ROS:  General: No change in weight, sleep or appetite.  Normal energy.  No fever or chills  Eyes: Negative for vision changes or eye problems  ENT: No problems with ears, nose or throat.  No difficulty swallowing.  Resp: No coughing, wheezing or shortness of breath  CV: No chest pains or palpitations  GI: No nausea, vomiting,  heartburn, abdominal pain, diarrhea, constipation or change in bowel habits  : No urinary frequency or dysuria, bladder or kidney problems  Musculoskeletal: No significant muscle or joint pains  Neurologic: No headaches, numbness, tingling, weakness, problems with balance or coordination  Psychiatric: No problems with anxiety, depression or mental health  Heme/immune/allergy: No history of bleeding or clotting problems or anemia.  No allergies or immune system problems  Endocrine: No history of thyroid disease, diabetes or other endocrine disorders  Skin: No rashes,worrisome lesions or skin problems  Vascular: History of provoked first lifetime thromboembolic event left leg DVT developed after surgery and she was on birth control NuvaRing and this was switched to Mirena after DVT    EXAM:  /87 (BP Location: Left arm, Patient Position:  Sitting, Cuff Size: Adult Regular)   Pulse 95   Wt 260 lb (117.9 kg)   SpO2 100%   BMI 35.26 kg/m    In general, the patient is a pleasant female in no apparent distress.    HEENT: NC/AT.  PERRLA.  EOMI.  Sclerae white, not injected.  Nares clear.  Pharynx without erythema or exudate.  Dentition intact.    Neck: No adenopathy.  No thyromegaly. Carotids +2/2 bilaterally without bruits.  No jugular venous distension.   Heart: RRR. Normal S1, S2 splits physiologically. No murmur, rub, click, or gallop. The PMI is in the 5th ICS in the midclavicular line. There is no heave.    Lungs: CTA.  No ronchi, wheezes, rales.  No dullness to percussion.   Abdomen: Soft, nontender, nondistended. No organomegaly. No AAA.  No bruits.   Extremities: left leg is slightly larger than the right leg distal portion of the foot bilateral and toes are colder with abnormal capillary response, hands are warm and well-perfused  Healed surgical site on the left knee  Good range of motion  No signs of infection      Labs:  LIPID RESULTS:  Lab Results   Component Value Date    CHOL 181 02/27/2024    CHOL 166 08/01/2016    HDL 37 (A) 02/27/2024    HDL 41 08/01/2016    LDL 64 02/27/2024    LDL 77 08/01/2016    TRIG 396 (A) 02/27/2024    TRIG 238 (H) 08/01/2016    CHOLHDLRATIO 4.1 11/09/2007       LIVER ENZYME RESULTS:  Lab Results   Component Value Date    AST 23 04/29/2025    AST 10 12/01/2022    ALT 25 04/29/2025    ALT 6 12/01/2022       CBC RESULTS:  Lab Results   Component Value Date    WBC 8.9 06/12/2024    WBC 7.4 12/01/2022    WBC 11.2 (H) 03/24/2015    RBC 4.87 06/12/2024    RBC 4.56 12/01/2022    RBC 5.06 03/24/2015    HGB 14.1 06/12/2024    HGB 13.0 12/01/2022    HCT 42.8 06/12/2024    HCT 37.9 12/01/2022    MCV 88 06/12/2024    MCV 83.0 12/01/2022    MCH 29.0 06/12/2024    MCH 28.6 12/01/2022    MCHC 32.9 06/12/2024    MCHC 34.4 12/01/2022    RDW 13.0 06/12/2024    RDW 13.4 03/24/2015     06/12/2024     12/01/2022        BMP RESULTS:  Lab Results   Component Value Date     04/29/2025     12/01/2022    POTASSIUM 4.2 04/29/2025    POTASSIUM 4.1 12/01/2022    CHLORIDE 107 04/29/2025    CHLORIDE 104 12/01/2022    CO2 22 04/29/2025    CO2 19 (L) 03/24/2015    ANIONGAP 13 04/29/2025    ANIONGAP 11 03/24/2015    GLC 92 04/29/2025    GLC 94 12/01/2022    BUN 12.8 04/29/2025    BUN 7 12/01/2022    BUN 9 12/01/2022    CR 0.88 04/29/2025    CR 0.82 12/01/2022    GFRESTIMATED 84 04/29/2025    GFRESTIMATED 71 03/24/2015    GFRESTBLACK 86 03/24/2015    ARELI 9.5 04/29/2025    ARELI 9.8 12/01/2022        A1C RESULTS:  Lab Results   Component Value Date    A1C 5.6 06/12/2024    A1C 5.5 12/01/2022       THYROID RESULTS:  Lab Results   Component Value Date    TSH 2.19 01/27/2011       Procedures:   EXAM: US LOWER EXTREMITY VENOUS DUPLEX LEFT  LOCATION: [facility]  DATE: 10/10/2024     INDICATION: Left calf pain after knee surgery yesterday.  COMPARISON: None.  TECHNIQUE: Venous Duplex ultrasound of the left lower extremity with and without compression, augmentation and duplex. Color flow and spectral Doppler with waveform analysis performed.     FINDINGS: Exam includes the common femoral, femoral, popliteal, and contralateral common femoral veins as well as segmentally visualized deep calf veins and greater saphenous vein.      LEFT: Nearly occlusive thrombus is seen in a gastrocnemius vein in the upper calf, proximally 6 cm in length. No superficial thrombophlebitis. No popliteal cyst.                                                                   IMPRESSION:     1.  Positive for deep venous thrombosis, with nearly occlusive thrombus in a gastrocnemius vein in the upper calf. This was reported to Dr. Trierweiler at 11:40 PM on 10/10/2024.    EXAM: US LOWER EXTREMITY VENOUS DUPLEX LEFT  LOCATION: [facility]  DATE: 1/13/2025     INDICATION: Left distal leg DVT back on 10 10 2024.  Please evalaute for residual thrombus after 3 months of anticoagulation therapy.  COMPARISON: 10/10/2024  TECHNIQUE: Venous Duplex ultrasound of the left lower extremity with and without compression, augmentation and duplex. Color flow and spectral Doppler with waveform analysis performed.     FINDINGS: Exam includes the common femoral, femoral, popliteal, and contralateral common femoral veins as well as segmentally visualized deep calf veins and greater saphenous vein.      LEFT: No deep vein thrombosis. No superficial thrombophlebitis. No popliteal cyst.                                                                      IMPRESSION:  1.  No deep venous thrombosis in the left lower extremity. Previously seen calf DVT has resolved.    Assessment and Plan:     1. Left leg swelling (Primary)  - US Lower Extremity Venous Duplex Left; Future  - Compression Sleeve/Stocking Order for DME - ONLY FOR DME    2. History of deep venous thrombosis  - US Lower Extremity Venous Duplex Left; Future  - Compression Sleeve/Stocking Order for DME - ONLY FOR DME    3. Bariatric surgery status    4. Single kidney after Left Nephrectomy for Wilm's Tumor at 11 yo    5. Raynaud's disease without gangrene     This is a very pleasant 39-year-old female with first lifetime thromboembolic event left lower extremity DVT provoked after surgery and she was on NuvaRing at the time subsequently that was removed and switched to the IUD Mirena treated appropriate duration of anticoagulation subsequent venous duplex ultrasound no DVT.  She now comes here for left leg swelling discoloration and cold toes.  She has no evidence of phlegmasia .  Palpable peripheral pulses  Both toes are cold with abnormal capillary response 5 to 6 seconds, hands are warm  Left leg is larger than right leg, given her symptoms, risk factors obesity etc. I have ordered stat left lower extremity venous duplex ultrasound personally reviewed no DVT noted    Suggested patient  utilize compression stockings during the daytime and elevate the legs DME Rx given  Discussed vascular spastic disorder specifically Raynaud's options of treatment etc.  If she develops severe symptoms of Raynaud's during the winter months we can either prescribe low-dose amlodipine or nifedipine or nitroglycerin ointment or combination etc..  Thermal protection suggested  Virtual or office visit in November 2025      60 minutes spent on the date of the encounter doing chart review, history and exam, documentation, and further activities as noted above.  She is new to me reviewed available records in the epic and updated chart.  Ordered stat venous duplex ultrasound personally reviewed.  Answered all her questions    The longitudinal care of plan for the above diagnoses was addressed during this visit. Due to added complexity of care, we will continue to supprt Tisha Almeida and the subsequent management of this/these conditions and with ongoing continuity of care for this/these conditions.     Thank you for the consultation  This note was dictated by utilizing Dragon software  Copy of this note to primary care physician    Mary Randolph MD,FAWENDI,FSVM,FNLA, FACP  Vascular Medicine  Clinical Hypertension Specialist   Clinical Lipidologist

## 2025-05-15 NOTE — ASSESSMENT & PLAN NOTE
Lap band Dr. Figueroa  on 5/26/2011  Has had poor response to Lap-Band, weight gain following surgery.  Patient interested in revision will check with insurance.

## 2025-05-15 NOTE — PATIENT INSTRUCTIONS
Nice to talk with you today. Below is the plan discussed.-  DANIEL Rivero      Pt Instructions:  Check with insurance to see if revision in surgery available for poor response to weight loss with Band.   Continue metformin  Decrease Topiramate to 25 mg for 3 day and then stop.   Bupropion: Take 1 tablet in the morning for the first week, if tolerating then increase to one tablet in the morning and one tablet in the evening.  (If you have trouble with sleep you can take your second dose in the afternoon instead)  Start Naltrexone. Take 1/2 tab in the am for a week.  If tolerating, increase to 1/2 tablet twice daily.   Labs ordered.  Please call [phone] to set up a lab appt in 3 mo.     Follow up:    Call [phone] to schedule next visit in 3 months.     Bupropion and Naltrexone    We are starting  two medications, Naltrexone and Bupropione (Wellbutrin). The Bupropion helps lessen appetite and the Naltrexone works by blocking certain receptors in the brain and curbing cravings.      For some of our patients the medication works right away. Other patients don't feel much of a change but find they've lost weight. Like all weight loss medications, Contrave works best when you help it work. This means:  1. Having less tempting high calorie (fattening) food around the house or office. (For people with strong cravings this is very important.)   2. Staying away from situations or people that may trigger your cravings .   3. Eating out only one time or less each week.  4. Eating your meals at a table with the TV or computer off.    Instructions:  Bupropion: Take 1 tablet in the morning for the first week, if tolerating then increase to one tablet in the morning and one tablet in the evening.  (If you have trouble with sleep you can take your second dose in the afternoon instead)    Naltrexone: Start with 1/2 tab 1-2 hours prior to the time you have the most trouble with cravings or extra hunger. If you are doing well  after a week, you may switch to 1/2 tablet twice daily.     Side-effects of naltrexone: The main side-effect we see is nausea or a mild headache and usually goes away with time.     Side-effects of bupropion : The most common side effect include: nausea; constipation; headache;  trouble sleeping; and dry mouth.     WARNING: Naltrexone blocks the action of opioid type pain medications. If you routinely take any medication like Codeine, Oxycontin, Percocet, Morphine, Dilaudid or Methodone, do not take this until you have talked with weight management staff.     FYI- If you are planning on having an elective surgery, you should STOP NALTREXONE 4 DAYS PRIOR TO SURGERY.

## 2025-05-21 ENCOUNTER — VIRTUAL VISIT (OUTPATIENT)
Dept: SURGERY | Facility: CLINIC | Age: 40
End: 2025-05-21
Payer: COMMERCIAL

## 2025-05-21 VITALS — BODY MASS INDEX: 35.21 KG/M2 | WEIGHT: 260 LBS | HEIGHT: 72 IN

## 2025-05-21 DIAGNOSIS — K91.2 POSTSURGICAL MALABSORPTION: ICD-10-CM

## 2025-05-21 DIAGNOSIS — Z98.84 BARIATRIC SURGERY STATUS: Primary | ICD-10-CM

## 2025-05-21 DIAGNOSIS — E66.812 OBESITY, CLASS II, BMI 35-39.9: ICD-10-CM

## 2025-05-21 PROCEDURE — 97803 MED NUTRITION INDIV SUBSEQ: CPT | Performed by: DIETITIAN, REGISTERED

## 2025-05-21 NOTE — PATIENT INSTRUCTIONS
Andrew Giles!      It was great chatting with you again today! Here's a summary of the goals we discussed:    1. Take 20-30 minutes to eat each meal  - Try taking smaller bites, chewing well, and/or setting utensil down in between bites    2. Add in strength training  - Work each major muscle group (upper body, lower body, core) 2-3x/week        Plan on following up in 12 months. This can be scheduled via our call center at . Reach out sooner with any questions or concerns. Have a great day!      Tisha El, RD, LD, [facility]  Clinical Dietitian

## 2025-05-21 NOTE — PROGRESS NOTES
"Tisha is a 39 year old who is being evaluated via a billable video visit.      The patient has been notified of following:     \"This video visit will be conducted via a call between you and your physician/provider. We have found that certain health care needs can be provided without the need for an in-person physical exam.  This service lets us provide the care you need with a video conversation.  If a prescription is necessary we can send it directly to your pharmacy.  If lab work is needed we can place an order for that and you can then stop by our lab to have the test done at a later time.    Video visits are billed at different rates depending on your insurance coverage.  Please reach out to your insurance provider with any questions.    If during the course of the call the physician/provider feels a video visit is not appropriate, you will not be charged for this service.\"    Patient has given verbal consent for Video visit? Yes    How would you like to obtain your AVS? MyChart    If the video visit is dropped, the invitation should be resent by: Text to cell phone: [phone]    Will anyone else be joining your video visit? No    I    Video-Visit Details    Type of service:  Video Visit    Originating Location (pt. Location): Home    Distant Location (provider location):   Off-Site - Provider Home Office    Platform used for Video Visit: [employer]    Video Start Time: 10:01am    Video End Time:10:16am    NUTRITION POST OP APPOINTMENT  DATE OF VISIT: May 21, 2025    Tisha Almeida  1985  female  [id number]  39 year old     ASSESSMENT:    REASON FOR VISIT:  Tisha is a 39 year old year old female presents today for 14 year PO nutrition follow-up appointment. Patient is accompanied by self.    DIAGNOSIS:  Status post laparoscopic band surgery.  Obesity Grade II BMI 35-39.9     ANTHROPOMETRICS:  Initial Weight: 280 lbs    Height: 6'   Current Weight: 260 lbs 0 oz  BMI: Body mass index is 35.26 " kg/m .    VITAMINS AND MINERALS:  1 Multivitamin with Minerals- Up and Up Women's (mid-morning)  600 mg Calcium With Vitamin D (PM) - pt notes her Calcium was high upon recent labwork. Addressed with PCP and recheck came back WNL. Discussed going back to BID vs okay to continue with once daily if utilizing [employer] protein shake on daily basis for additional 600mg.   1000 International units Vitamin D        Not getting regular periods     MEDICATION FOR WEIGHT LOSS:  Metformin  Bupropion  Naltrexone     NUTRITION HISTORY:  Breakfast: [employer] protein shake   Lunch: leftovers  1/2 salad kit w/chicken or steak + fruit +/- pop chips  Supper: pizza - 2 slices + side spinach salad  [facility] - grilled chicken + green beans + sweet potatoes  burger (no bun) + vegetables + handful of fries + fruit   Snacks: [morning] occasional egg bites or 2 HB eggs + fruit  [afternoon] turkey stick, string cheese, low-fat popcorn  [evenings] none  Fluids consumed: water (64-96oz), protein drink (daily), diet coke (12oz), coffee (8oz, black), Gatorade Zero (occasional), EtOH (socially)   Consuming liquid calories: Yes  Protein intake: 60-90 grams/day  Tolerate regular texture food: Yes  Portion size: 1 cup or more  Take 20-30 minutes to consume each meal: No   Eat protein foods first: Yes  Fluids and meals separate by at least 30 minutes: Yes  Chew foods thoroughly: Yes  Tolerating diet: Yes  Drinking high protein supplements: Yes  Consuming snacks per day: 1-2  Additional Information: Annual visit; pt overall going well - tolerating AOM + diet and no major concerns today. Discussed adding in strength training and reviewed metabolic rationale.         PHYSICAL ACTIVITY:  Pickleball 1-2x/week for 90 minutes  Volleyball league  Walks dog 30-45 minutes/day    DIAGNOSIS:  Previous Nutrition Diagnosis: Altered gastrointestinal function related to alteration in gastrointestinal structure as evidenced by history of laparoscopic  band surgery.- no change    Previous goals:  Add in strength training 2X per week - not met  When hungry between lunch and dinner have snack with protein or second protein drink - not needed  Take at least 20 minuet fro dinner - not met    Current Nutrition Diagnosis: Altered gastrointestinal function related to alteration in gastrointestinal structure as evidenced by history of laparoscopic band surgery.    INTERVENTION:   Nutrition Prescription: Eat 3 meals a day at regular intervals. Consume 60-90 grams of protein daily. Follow post-surgical vitamin and mineral protocol.  Assessed learning needs and learning preferences.    GOALS:  Take 20-30 minutes to eat each meal  Strength train 2-3x/week    Follow-Up:   Recommend annual follow up visits to assist with lifestyle changes or per insurance.  Implementation: Discussed progress toward previous goals; reinforced importance of following bariatric lifestyle changes.    NUTRITION MONITORING AND EVALUATION:  Anticipated compliance: good  Verbalized good understanding.    Follow up: Patient to follow up in 12 months.    TIME SPENT WITH PATIENT:  15 minutes      Tisha El RD, LD, CSM  Clinical Dietitian

## 2025-06-12 DIAGNOSIS — E66.812 CLASS 2 SEVERE OBESITY DUE TO EXCESS CALORIES WITH SERIOUS COMORBIDITY AND BODY MASS INDEX (BMI) OF 37.0 TO 37.9 IN ADULT (H): ICD-10-CM

## 2025-06-12 DIAGNOSIS — E66.01 CLASS 2 SEVERE OBESITY DUE TO EXCESS CALORIES WITH SERIOUS COMORBIDITY AND BODY MASS INDEX (BMI) OF 37.0 TO 37.9 IN ADULT (H): ICD-10-CM

## 2025-06-12 DIAGNOSIS — E28.2 PCOS (POLYCYSTIC OVARIAN SYNDROME): ICD-10-CM

## 2025-06-12 RX ORDER — METFORMIN HYDROCHLORIDE 500 MG/1
TABLET, EXTENDED RELEASE ORAL
Qty: 270 TABLET | Refills: 0 | Status: SHIPPED | OUTPATIENT
Start: 2025-06-12

## 2025-06-23 DIAGNOSIS — G43.109 MIGRAINE WITH AURA AND WITHOUT STATUS MIGRAINOSUS, NOT INTRACTABLE: ICD-10-CM

## 2025-06-23 ASSESSMENT — MIGRAINE DISABILITY ASSESSMENT (MIDAS)
HOW MANY DAYS WAS HOUSEWORK PRODUCTIVITY CUT IN HALF DUE TO HEADACHES: 1
ON A SCALE FROM 0-10 ON AVERAGE HOW PAINFUL WERE HEADACHES: 6
TOTAL SCORE: 4
HOW MANY DAYS DID YOU MISS WORK OR SCHOOL BECAUSE OF HEADACHES: 1
HOW MANY DAYS DID YOU NOT DO HOUSEWORK BECAUSE OF HEADACHES: 1
HOW OFTEN WERE SOCIAL ACTIVITIES MISSED DUE TO HEADACHES: 0
HOW MANY DAYS IN THE PAST 3 MONTHS HAVE YOU HAD A HEADACHE: 3
HOW MANY DAYS WAS YOUR PRODUCTIVITY CUT IN HALF BECAUSE OF HEADACHES: 1

## 2025-06-23 ASSESSMENT — HEADACHE IMPACT TEST (HIT 6)
HOW OFTEN DO HEADACHES LIMIT YOUR DAILY ACTIVITIES: VERY OFTEN
HIT6 TOTAL SCORE: 65
HOW OFTEN DID HEADACHS LIMIT CONCENTRATION ON WORK OR DAILY ACTIVITY: SOMETIMES
HOW OFTEN HAVE YOU FELT FED UP OR IRRITATED BECAUSE OF YOUR HEADACHES: RARELY
WHEN YOU HAVE A HEADACHE HOW OFTEN DO YOU WISH YOU COULD LIE DOWN: ALWAYS
WHEN YOU HAVE HEADACHES HOW OFTEN IS THE PAIN SEVERE: ALWAYS
HOW OFTEN HAVE YOU FELT TOO TIRED TO WORK BECAUSE OF YOUR HEADACHES: SOMETIMES

## 2025-06-23 NOTE — TELEPHONE ENCOUNTER
Neuroscience Clinic Task Note    TASK    Neurology Rx Refill  Medication ubrogepant (UBRELVY) 50 MG tablet    Dose Take 1-2 tablets ( mg) by mouth at onset of headache (migraine. May repeat in 2 hours as needed. Max 200 mg in 24 hours) - Oral    Last refill ordered (m/d/y) 06/20/2024   Last quantity ordered 16   Last # refills 11   Last clinic visit with ordering provider (m/d/y) 06/20/2024   Next clinic visit with ordering provider (m/d/y) None Scheduled   All pertinent protocol data (lab date/result)    Pertinent information from patient's message        FOLLOW-UP      ADDITIONAL COMMENTS  Pt is due for a follow up. T'd up 90 days refill, added a note to pharmacy, and messaged the pt to schedule a return.       Suzy Schmidt

## 2025-06-24 ENCOUNTER — VIRTUAL VISIT (OUTPATIENT)
Dept: NEUROLOGY | Facility: CLINIC | Age: 40
End: 2025-06-24
Payer: COMMERCIAL

## 2025-06-24 VITALS — WEIGHT: 250 LBS | HEIGHT: 70 IN | BODY MASS INDEX: 35.79 KG/M2

## 2025-06-24 DIAGNOSIS — G43.109 MIGRAINE WITH AURA AND WITHOUT STATUS MIGRAINOSUS, NOT INTRACTABLE: ICD-10-CM

## 2025-06-24 PROCEDURE — 1126F AMNT PAIN NOTED NONE PRSNT: CPT | Performed by: NURSE PRACTITIONER

## 2025-06-24 PROCEDURE — 98004 SYNCH AUDIO-VIDEO EST SF 10: CPT | Performed by: NURSE PRACTITIONER

## 2025-06-24 ASSESSMENT — PAIN SCALES - GENERAL: PAINLEVEL_OUTOF10: NO PAIN (0)

## 2025-06-24 NOTE — PATIENT INSTRUCTIONS
Acute Treatment:  -For acute treatment of mild headache, take acetaminophen as needed. Do not exceed more than 14 days per month to avoid medication overuse.  -For acute treatment of moderate to severe headache, take ubrelvy  at the onset of headache, with a repeat dose in two hours if needed. Do not exceed more than 9 days per month to avoid medication overuse.  -For headache related nausea, or as a rescue medicine for headache, not needed.     Preventive Treatment:  -For headache prevention,   Saty hydrated  Magnesium 400 mg orally and vit B2 400 mg orally supplements at bedtime OTC if tolerated    Follow up in a year if stable or sooner if needed

## 2025-06-24 NOTE — LETTER
6/24/2025       RE: Tisha Almeida  [address] [facility] [address]     Dear Colleague,    Thank you for referring your patient, Tisha Almeida, to the [facility] [place] [facility]. Please see a copy of my visit note below.    Virtual Visit Details  Type of service:  Video Visit   Originating Location (pt. Location): Home  Distant Location (provider location):  On-site  Platform used for Video Visit: [facility]    Headache Neurology Progress Note  June 24, 2025      Assessment/Plan:   Tisha Almeida is a 39 year old   Episodic migraines and managed with rescue treatment as needed.  No new changes reported.    Headache treatment plan was discussed with the patient  Acute Treatment:  -For acute treatment of mild headache, take acetaminophen as needed. Do not exceed more than 14 days per month to avoid medication overuse.  -For acute treatment of moderate to severe headache, take ubrelvy  at the onset of headache, with a repeat dose in two hours if needed. Do not exceed more than 9 days per month to avoid medication overuse.  -For headache related nausea, or as a rescue medicine for headache, not needed.     Preventive Treatment:  -For headache prevention,   Saty hydrated  Magnesium 400 mg orally and vit B2 400 mg orally supplements at bedtime OTC if tolerated    Follow up in a year if stable or sooner if needed     10 minutes spent on the date of the encounter doing video access, chart  review,  results review,  meds review, treatment plan, documentation and further activities as noted above    ALYSE Newman, CNP [facility]    Subjective:    Tisha Almeida returns for follow up of headaches   Headaches have been good and more noticeable with weather change, stress.   May be once per month and around menstrual period.   Rescue treatment -ubrelvy 50 mg  "as needed and works well. No side effects.   Stopped topiramate 1-2 months ago and may be more headaches lately -took ubrelvy a couple times more lately -twice per month in the past 1-2 months. Will keep track headaches.     Objective:    Vitals: Ht 1.778 m (5' 10\")   Wt 113.4 kg (250 lb)   BMI 35.87 kg/m    General: Cooperative, NAD  Neurologic:  Mental Status: Fully alert, attentive and oriented. Speech clear and fluent.   Cranial Nerves: Facial movements symmetric.   Motor: No abnormal movements.      Pertinent Investigations:    reviewed        11/19/2022    12:37 PM 6/15/2024    10:58 AM 6/23/2025     4:04 PM   HIT-6   When you have headaches, how often is the pain severe 11 11 13   How often do headaches limit your ability to do usual daily activities including household work, work, school, or social activities? 13 11 11   When you have a headache, how often do you wish you could lie down? 13 11 13   In the past 4 weeks, how often have you felt too tired to do work or daily activities because of your headaches 11 10 10   In the past 4 weeks, how often have you felt fed up or irritated because of your headaches 11 11 8   In the past 4 weeks, how often did headaches limit your ability to concentrate on work or daily activities 13 11 10   HIT-6 Total Score 72 65 65        Patient-reported           6/15/2024    11:02 AM 6/23/2025     4:06 PM   MIDAS - in the past three months:   On how many days did you miss work or school because of your headaches? 0 1   How many days was your productivity at work or school reduced by half or more because of your headaches? 0 1   On how many days did you not do household work because of your headaches? 0 1   How many days was your productivity in household work reduced by half or more because of your headaches? 0 1   On how many days did you miss family, social, or leisure activities because of your headaches? 0 0   On how many days did you have a headache? 5 3   On a scale of " 0-10, on average how painful were these headaches? 6 6   MIDAS Score 0 (I - Little or No Disability) 4 (I - Little or No Disability)          Again, thank you for allowing me to participate in the care of your patient.      Sincerely,    ALYSE Cote CNP

## 2025-06-24 NOTE — NURSING NOTE
Current patient location: [address] [place] [id number]    Is the patient currently in the state of [place]? YES    Visit mode: VIDEO    If the visit is dropped, the patient can be reconnected by:VIDEO VISIT: Text to cell phone:   Telephone Information:   Mobile [phone]       Will anyone else be joining the visit? NO  (If patient encounters technical issues they should call [phone]    Are changes needed to the allergy or medication list? Pt stated no changes to allergies and Pt stated no med changes    Are refills needed on medications prescribed by this physician? NO    Rooming Documentation:  Questionnaire(s) completed    Reason for visit: JAONN ADAMSF

## 2025-06-24 NOTE — PROGRESS NOTES
"Virtual Visit Details  Type of service:  Video Visit   Originating Location (pt. Location): Home  Distant Location (provider location):  On-site  Platform used for Video Visit: [facility]    Headache Neurology Progress Note  June 24, 2025      Assessment/Plan:   Tisha Almeida is a 39 year old   Episodic migraines and managed with rescue treatment as needed.  No new changes reported.    Headache treatment plan was discussed with the patient  Acute Treatment:  -For acute treatment of mild headache, take acetaminophen as needed. Do not exceed more than 14 days per month to avoid medication overuse.  -For acute treatment of moderate to severe headache, take ubrelvy  at the onset of headache, with a repeat dose in two hours if needed. Do not exceed more than 9 days per month to avoid medication overuse.  -For headache related nausea, or as a rescue medicine for headache, not needed.     Preventive Treatment:  -For headache prevention,   Saty hydrated  Magnesium 400 mg orally and vit B2 400 mg orally supplements at bedtime OTC if tolerated    Follow up in a year if stable or sooner if needed     10 minutes spent on the date of the encounter doing video access, chart  review,  results review,  meds review, treatment plan, documentation and further activities as noted above    ALYSE Newman, CNP [facility]    Subjective:    Tisha Almeida returns for follow up of headaches   Headaches have been good and more noticeable with weather change, stress.   May be once per month and around menstrual period.   Rescue treatment -ubrelvy 50 mg as needed and works well. No side effects.   Stopped topiramate 1-2 months ago and may be more headaches lately -took ubrelvy a couple times more lately -twice per month in the past 1-2 months. Will keep track headaches.     Objective:    Vitals: Ht 1.778 m (5' 10\")   Wt 113.4 kg (250 lb)   BMI 35.87 kg/m    General: " Cooperative, NAD  Neurologic:  Mental Status: Fully alert, attentive and oriented. Speech clear and fluent.   Cranial Nerves: Facial movements symmetric.   Motor: No abnormal movements.      Pertinent Investigations:    reviewed        11/19/2022    12:37 PM 6/15/2024    10:58 AM 6/23/2025     4:04 PM   HIT-6   When you have headaches, how often is the pain severe 11 11 13   How often do headaches limit your ability to do usual daily activities including household work, work, school, or social activities? 13 11 11   When you have a headache, how often do you wish you could lie down? 13 11 13   In the past 4 weeks, how often have you felt too tired to do work or daily activities because of your headaches 11 10 10   In the past 4 weeks, how often have you felt fed up or irritated because of your headaches 11 11 8   In the past 4 weeks, how often did headaches limit your ability to concentrate on work or daily activities 13 11 10   HIT-6 Total Score 72 65 65        Patient-reported           6/15/2024    11:02 AM 6/23/2025     4:06 PM   MIDAS - in the past three months:   On how many days did you miss work or school because of your headaches? 0 1   How many days was your productivity at work or school reduced by half or more because of your headaches? 0 1   On how many days did you not do household work because of your headaches? 0 1   How many days was your productivity in household work reduced by half or more because of your headaches? 0 1   On how many days did you miss family, social, or leisure activities because of your headaches? 0 0   On how many days did you have a headache? 5 3   On a scale of 0-10, on average how painful were these headaches? 6 6   MIDAS Score 0 (I - Little or No Disability) 4 (I - Little or No Disability)

## 2025-07-29 NOTE — PROGRESS NOTES
Answers submitted by the patient for this visit:  General Questionnaire (Submitted on 7/29/2025)  Chief Complaint: Chronic problems general questions HPI Form  What is the reason for your visit today? : Ear issues  How many servings of fruits and vegetables do you eat daily?: 4 or more  On average, how many sweetened beverages do you drink each day (Examples: soda, juice, sweet tea, etc.  Do NOT count diet or artificially sweetened beverages)?: 0  How many minutes a day do you exercise enough to make your heart beat faster?: 30 to 60  How many days a week do you exercise enough to make your heart beat faster?: 5  How many days per week do you miss taking your medication?: 0  Questionnaire about: Chronic problems general questions HPI Form (Submitted on 7/29/2025)  Chief Complaint: Chronic problems general questions HPI Form  ROS:  General and Resp. completed and negative except as noted above    Histories: reviewed    OBJECTIVE:                                                    /62   Pulse 91   Wt 117.7 kg (259 lb 6.4 oz)   SpO2 98%   BMI 37.22 kg/m    Body mass index is 37.22 kg/m .   APPEARANCE: = Relaxed and in no distress  Conj/Eyelids = noninjected and lids and lashes are without inflammation  PERRLA/Irises = Pupils Round Reactive to Light and Irisis without inflammation  Ears/Nose = External structures and Nares have usual shape and form  Ear canals and TM's = TM congested/bulging with scarring on the right drum  Neck = No anterior or posterior adenopathy appreciated.     ASSESSMENT/PLAN:                                                    Quality gaps reviewed    Tisha was seen today for ear problem.    Diagnoses and all orders for this visit:    Dysfunction of right eustachian tube  -     fluticasone (FLONASE) 50 MCG/ACT nasal spray; Spray 1 spray into both nostrils daily.    Lets try this first and if it doesn't help will see ENT    Work on weight loss    Health maintenance items are reviewed in  Epic and correct as of today:    Alvin Yoon MD  [facility]  Family Practice  [facility]  [phone]    For any issues my office # is [phone]    The longitudinal plan of care for the diagnosis(es)/condition(s) as documented were addressed during this visit. Due to the added complexity in care, I will continue to support Tisha in the subsequent management and with ongoing continuity of care.

## 2025-07-30 ENCOUNTER — OFFICE VISIT (OUTPATIENT)
Dept: FAMILY MEDICINE | Facility: CLINIC | Age: 40
End: 2025-07-30

## 2025-07-30 VITALS
HEART RATE: 91 BPM | WEIGHT: 259.4 LBS | DIASTOLIC BLOOD PRESSURE: 62 MMHG | SYSTOLIC BLOOD PRESSURE: 117 MMHG | OXYGEN SATURATION: 98 % | BODY MASS INDEX: 37.22 KG/M2

## 2025-07-30 DIAGNOSIS — H69.91 DYSFUNCTION OF RIGHT EUSTACHIAN TUBE: Primary | ICD-10-CM

## 2025-07-30 PROCEDURE — 3078F DIAST BP <80 MM HG: CPT | Performed by: FAMILY MEDICINE

## 2025-07-30 PROCEDURE — G2211 COMPLEX E/M VISIT ADD ON: HCPCS | Performed by: FAMILY MEDICINE

## 2025-07-30 PROCEDURE — 99213 OFFICE O/P EST LOW 20 MIN: CPT | Performed by: FAMILY MEDICINE

## 2025-07-30 PROCEDURE — 3074F SYST BP LT 130 MM HG: CPT | Performed by: FAMILY MEDICINE

## 2025-07-30 RX ORDER — FLUTICASONE PROPIONATE 50 MCG
1 SPRAY, SUSPENSION (ML) NASAL DAILY
COMMUNITY
Start: 2025-07-30

## 2025-08-28 ENCOUNTER — TRANSFERRED RECORDS (OUTPATIENT)
Dept: FAMILY MEDICINE | Facility: CLINIC | Age: 40
End: 2025-08-28